# Patient Record
Sex: MALE | Race: ASIAN | NOT HISPANIC OR LATINO | Employment: FULL TIME | URBAN - METROPOLITAN AREA
[De-identification: names, ages, dates, MRNs, and addresses within clinical notes are randomized per-mention and may not be internally consistent; named-entity substitution may affect disease eponyms.]

---

## 2017-04-03 ENCOUNTER — HOSPITAL ENCOUNTER (OUTPATIENT)
Dept: GENERAL RADIOLOGY | Facility: HOSPITAL | Age: 37
Discharge: HOME | End: 2017-04-03
Attending: INTERNAL MEDICINE | Admitting: INTERNAL MEDICINE

## 2017-04-03 ENCOUNTER — TRANSCRIBE ORDERS (OUTPATIENT)
Dept: ADMINISTRATIVE | Facility: HOSPITAL | Age: 37
End: 2017-04-03

## 2017-04-03 DIAGNOSIS — M79.674 TOE PAIN, RIGHT: Primary | ICD-10-CM

## 2017-04-03 PROCEDURE — 73660 X-RAY EXAM OF TOE(S): CPT

## 2017-04-21 ENCOUNTER — TRANSCRIBE ORDERS (OUTPATIENT)
Dept: ADMINISTRATIVE | Facility: HOSPITAL | Age: 37
End: 2017-04-21

## 2017-04-21 DIAGNOSIS — M79.674 TOE PAIN, RIGHT: Primary | ICD-10-CM

## 2018-04-13 ENCOUNTER — TRANSCRIBE ORDERS (OUTPATIENT)
Dept: ADMINISTRATIVE | Facility: HOSPITAL | Age: 38
End: 2018-04-13

## 2018-04-16 ENCOUNTER — TRANSCRIBE ORDERS (OUTPATIENT)
Dept: ADMINISTRATIVE | Facility: HOSPITAL | Age: 38
End: 2018-04-16

## 2018-04-16 DIAGNOSIS — M79.672 BILATERAL FOOT PAIN: Primary | ICD-10-CM

## 2018-04-16 DIAGNOSIS — M79.671 BILATERAL FOOT PAIN: Primary | ICD-10-CM

## 2018-04-20 ENCOUNTER — HOSPITAL ENCOUNTER (OUTPATIENT)
Dept: NUCLEAR MEDICINE | Facility: HOSPITAL | Age: 38
Discharge: HOME | End: 2018-04-20
Attending: INTERNAL MEDICINE

## 2018-04-20 DIAGNOSIS — M79.671 BILATERAL FOOT PAIN: ICD-10-CM

## 2018-04-20 DIAGNOSIS — M79.672 BILATERAL FOOT PAIN: ICD-10-CM

## 2018-04-20 PROCEDURE — 0 TECHNETIUM MEDRONATE KIT: Performed by: INTERNAL MEDICINE

## 2018-04-20 PROCEDURE — A9503 TC99M MEDRONATE: HCPCS | Performed by: INTERNAL MEDICINE

## 2018-04-20 PROCEDURE — 78315 BONE IMAGING 3 PHASE: CPT

## 2018-04-20 RX ORDER — TC 99M MEDRONATE 20 MG/10ML
22 INJECTION, POWDER, LYOPHILIZED, FOR SOLUTION INTRAVENOUS
Status: COMPLETED | OUTPATIENT
Start: 2018-04-20 | End: 2018-04-20

## 2018-04-20 RX ADMIN — Medication 22 MILLICURIE: at 11:00

## 2018-05-02 ENCOUNTER — OFFICE VISIT (OUTPATIENT)
Dept: ORTHOPEDIC SURGERY | Facility: CLINIC | Age: 38
End: 2018-05-02

## 2018-05-02 VITALS
SYSTOLIC BLOOD PRESSURE: 118 MMHG | HEIGHT: 72 IN | WEIGHT: 169.53 LBS | BODY MASS INDEX: 22.96 KG/M2 | DIASTOLIC BLOOD PRESSURE: 95 MMHG | HEART RATE: 87 BPM

## 2018-05-02 DIAGNOSIS — R52 PAIN: Primary | ICD-10-CM

## 2018-05-02 DIAGNOSIS — M19.90 INFLAMMATORY ARTHRITIS: ICD-10-CM

## 2018-05-02 PROCEDURE — 99204 OFFICE O/P NEW MOD 45 MIN: CPT | Performed by: ORTHOPAEDIC SURGERY

## 2018-05-02 PROCEDURE — 20600 DRAIN/INJ JOINT/BURSA W/O US: CPT | Performed by: ORTHOPAEDIC SURGERY

## 2018-05-02 RX ORDER — METHYLPREDNISOLONE ACETATE 40 MG/ML
40 INJECTION, SUSPENSION INTRA-ARTICULAR; INTRALESIONAL; INTRAMUSCULAR; SOFT TISSUE
Status: COMPLETED | OUTPATIENT
Start: 2018-05-02 | End: 2018-05-02

## 2018-05-02 RX ORDER — BUPIVACAINE HYDROCHLORIDE 2.5 MG/ML
0.25 INJECTION, SOLUTION INFILTRATION; PERINEURAL
Status: COMPLETED | OUTPATIENT
Start: 2018-05-02 | End: 2018-05-02

## 2018-05-02 RX ORDER — ROSUVASTATIN CALCIUM 20 MG/1
20 TABLET, COATED ORAL DAILY
COMMUNITY
End: 2019-03-01 | Stop reason: SDUPTHER

## 2018-05-02 RX ORDER — BUPIVACAINE HYDROCHLORIDE 2.5 MG/ML
0.5 INJECTION, SOLUTION INFILTRATION; PERINEURAL
Status: COMPLETED | OUTPATIENT
Start: 2018-05-02 | End: 2018-05-02

## 2018-05-02 RX ADMIN — METHYLPREDNISOLONE ACETATE 40 MG: 40 INJECTION, SUSPENSION INTRA-ARTICULAR; INTRALESIONAL; INTRAMUSCULAR; SOFT TISSUE at 13:55

## 2018-05-02 RX ADMIN — BUPIVACAINE HYDROCHLORIDE 0.25 ML: 2.5 INJECTION, SOLUTION INFILTRATION; PERINEURAL at 13:56

## 2018-05-02 RX ADMIN — BUPIVACAINE HYDROCHLORIDE 0.5 ML: 2.5 INJECTION, SOLUTION INFILTRATION; PERINEURAL at 13:55

## 2018-05-02 RX ADMIN — METHYLPREDNISOLONE ACETATE 40 MG: 40 INJECTION, SUSPENSION INTRA-ARTICULAR; INTRALESIONAL; INTRAMUSCULAR; SOFT TISSUE at 13:56

## 2018-05-02 NOTE — PROGRESS NOTES
Procedure   Small Joint Arthrocentesis  Consent given by: patient  Site marked: site marked  Timeout: Immediately prior to procedure a time out was called to verify the correct patient, procedure, equipment, support staff and site/side marked as required   Supporting Documentation  Indications: pain   Procedure Details  Location: third toe - L third MTP  Needle size: 22 G  Approach: dorsal  Medications administered: 0.5 mL bupivacaine 0.25 %; 40 mg methylPREDNISolone acetate 40 MG/ML  Patient tolerance: patient tolerated the procedure well with no immediate complications

## 2018-05-02 NOTE — PROGRESS NOTES
Procedure   Small Joint Arthrocentesis  Consent given by: patient  Site marked: site marked  Timeout: Immediately prior to procedure a time out was called to verify the correct patient, procedure, equipment, support staff and site/side marked as required   Supporting Documentation  Indications: pain   Procedure Details  Location: second toe - R second MTP  Needle size: 22 G  Approach: dorsal  Medications administered: 40 mg methylPREDNISolone acetate 40 MG/ML; 0.25 mL bupivacaine 0.25 %  Patient tolerance: patient tolerated the procedure well with no immediate complications

## 2018-05-02 NOTE — PROGRESS NOTES
NEW PATIENT    Patient: Reymundo Celeste  : 1980    Primary Care Provider: Dariel Marie MD    Requesting Provider: As above    Pain of the Right Foot and Pain of the Left Foot      History    Chief Complaint: Bilateral toe pain    History of Present Illness: This is a very pleasant 37-year-old gentleman with an interesting history.  He reports that about a year ago he developed pain in the right second toe.  The toe was swollen.  The metatarsal phalangeal joint was swollen.  It was more painful barefoot, felt better with padding.  He saw a podiatrist, who recommended athletic shoes and an elastic sleeve with a gel pad.  He reports that the pain is much better with the pad in the shoe, but it still severe barefoot, and still hurts at the end of a long day.  In January of this year he began to have similar pain and swelling in the left third toe.  He notes that the toe often looks like a sausage.  It is worse barefoot, better with padding.  He notes that even if his son slightly touches the third toe, it's very painful.  2 years ago he had a history of a left knee effusion, with essentially negative MRI.  Dr. Lopez aspirated it, and thought that he might have an inflammatory arthritis.  He saw Dr. Bandar Glass, but no definitive diagnosis of inflammatory arthritis was made.  He has another appointment with Dr. Glass in the near future, due to the presence of the toe pain and swelling.  He had a bone scan done that I looked at, it was read as a possible stress fracture in the right second proximal phalanx and left third proximal phalanx.  This was done on 2018.  To my evaluation, I  do not think it is a stress fracture.  I think it is probably a response to inflammation in the joint, and periostitis.  I explained that stress fractures in phalanges are extraordinarily rare.  However when the joint is inflamed and a toe has periostitis, the phalanx has increased uptake on a bone scan.  He  "reports he has had some neck stiffness and soreness over the past several days, due to  long distance traveling.  No other joint problems.  He is a  for a [employer].  He has played cricket quite avidly in the past.    No current outpatient prescriptions on file prior to visit.     No current facility-administered medications on file prior to visit.       No Known Allergies   History reviewed. No pertinent past medical history.  No past surgical history on file.  Family History   Problem Relation Age of Onset   • Cancer Mother    • Diabetes Mother    • Hypertension Mother    • Hypertension Father       Social History     Social History   • Marital status:      Spouse name: N/A   • Number of children: N/A   • Years of education: N/A     Occupational History   • Not on file.     Social History Main Topics   • Smoking status: Never Smoker   • Smokeless tobacco: Never Used   • Alcohol use No   • Drug use: No   • Sexual activity: Defer     Other Topics Concern   • Not on file     Social History Narrative   • No narrative on file        Review of Systems   Constitutional: Negative.    HENT: Negative.    Eyes: Negative.    Respiratory: Negative.    Cardiovascular: Negative.    Gastrointestinal: Negative.    Endocrine: Negative.    Genitourinary: Negative.    Musculoskeletal: Positive for arthralgias, joint swelling, neck pain and neck stiffness.   Skin: Negative.    Allergic/Immunologic: Negative.    Neurological: Negative.    Hematological: Negative.    Psychiatric/Behavioral: Negative.        The following portions of the patient's history were reviewed and updated as appropriate: allergies, current medications, past family history, past medical history, past social history, past surgical history and problem list.    Physical Exam:   /95   Pulse 87   Ht 182.9 cm (72\")   Wt 76.9 kg (169 lb 8.5 oz)   BMI 22.99 kg/m²   GENERAL: Body habitus: normal weight for height    Lower extremity " edema: Left: none; Right: none    Varicose veins:  Left: none; Right: none    Gait: normal     Mental Status:  awake and alert; oriented to person, place, and time    Voice:  clear  SKIN:  Normal and warm and dry    Hair Growth:  Right:normal; Left:  normal  NAILS: Toenails: normal  HEENT: Head: Normocephalic, atraumatic,  without obvious abnormality.  eye: normal external eye, no icterus  ears: normal external ears  nose: normal external nose  pharynx: dental hygiene adequate  PULM:  Repiratory effort normal  CV:  Dorsalis Pedis:  Right: 2+; Left:2+    Posterior Tibial: Right:2+; Left:2+    Capillary Refill:  Brisk  MSK:  Hand:right handed, sensation intact and No synovitis      Tibia:  Right:  non tender; Left:  non tender      Ankle:  Right: non tender, ROM  normal and symmetric and motor function  normal; Left:  non tender, ROM  normal and symmetric and motor function  normal      Foot:  Right:  Second toe is mildly thickened throughout its entirety, no significant hammertoe deformity, very tender to palpation throughout the toe, and in the second metatarsal phalangeal joint, otherwise nontender; Left:  Very tender throughout the third toe, tender in the third metatarsal phalangeal joint, third toe is moderately thickened compared to the right foot.  Otherwise nontender, no significant hammertoe deformity      NEURO: Heel Walking:  Right:  normal; Left:  normal    Toe Walking:  Right:  limited ability, painful; Left:  limited ability, painful     [place] 5.07 monofilament test: normal    Lower extremity sensation: intact     Reflexes:  Biceps:  Right:  1+; Left:  1+           Quads:  Right:  1+; Left:  1+           Ankle:  Right:  1+; Left:  1+      Calf Atrophy:none    Motor Function: all 5/5         Medical Decision Making    Data Review:   ordered and reviewed x-rays today, reviewed prior lab results, reviewed radiology images, reviewed radiology results and reviewed outside records    Assessment and  "Plan/ Diagnosis/Treatment options:   1. Inflammatory arthritis  I did not find any evidence of psoriasis, but given his history of knee inflammation and swelling, and the current sausage digits, I suspect he has psoriatic arthritis.  It also might be one of the other seronegative inflammatory arthritis.  I explained to him why I do not think the bone scan represents a stress fracture, again a stress fracture in her phalanx is extraordinarily rare.  I explained that synovitis of metatarsal phalangeal joints is fairly common, it can be the start of developing a hammertoe.  However it's not common in 37-year-old man.  And it's not common for the toe itself to be swollen.  I do think he has sausage digits, which indicates periostitis, as well as inflammation in the metatarsal phalangeal joints.  I think it's a very good idea that he has an appointment with Dr. Glass in the near future.  I looked at the notes from Dr. piedad diop in the past, and the results of lab studies.  Again I do think he has some type of inflammatory process.  I do not think what he has is the \"garden-variety\" early hammertoe problem.  I would still treat him symptomatically however, I recommend custom orthotics and Budin splints.  I showed him how to use the splints.  We also discussed cortisone injection.  Because he had good results from his knee injection, he would like to try that.  I explained that it does increase the risk of dislocation of the toes, and that it is important that he keep the Budin splints on whenever he is walking.  I explained I do not do more than 1 injection in the small metatarsal phalangeal joints.  Unlike large joints they don't tolerate repeated injection, because of the risk of ligamentous loosening.  I will see him again in 4-5 months standing x-ray of both feet.    After discussing the risks (including infection, skin atrophy, etc), time out was called,  the right 2nd MTPJ was prepped and using sterile technique " injected with 0.5cc of 0.5% marcaine and 1cc (40mg) DepoMedrol.  A band aid was applied.  No complications.     After discussing the risks (including infection, skin atrophy, etc), time out was called,  the left 3rd MTPJ was prepped and using sterile technique injected with 0.5cc of 0.5% marcaine and 1cc (40mg) DepoMedrol.  A band aid was applied.  No complications.

## 2019-02-21 PROBLEM — Z87.828 HISTORY OF MENISCAL TEAR: Status: ACTIVE | Noted: 2019-02-21

## 2019-02-21 PROBLEM — E55.9 VITAMIN D DEFICIENCY: Status: ACTIVE | Noted: 2019-02-21

## 2019-02-21 PROBLEM — Z87.442 HISTORY OF KIDNEY STONES: Status: ACTIVE | Noted: 2019-02-21

## 2019-02-21 PROBLEM — E78.2 MIXED HYPERLIPIDEMIA: Status: ACTIVE | Noted: 2019-02-21

## 2019-02-26 ENCOUNTER — OFFICE VISIT (OUTPATIENT)
Dept: INTERNAL MEDICINE | Facility: CLINIC | Age: 39
End: 2019-02-26

## 2019-02-26 VITALS
SYSTOLIC BLOOD PRESSURE: 112 MMHG | HEART RATE: 72 BPM | HEIGHT: 72 IN | DIASTOLIC BLOOD PRESSURE: 78 MMHG | BODY MASS INDEX: 23.43 KG/M2 | WEIGHT: 173 LBS

## 2019-02-26 DIAGNOSIS — M19.90 INFLAMMATORY ARTHRITIS: ICD-10-CM

## 2019-02-26 DIAGNOSIS — Z87.442 HISTORY OF KIDNEY STONES: ICD-10-CM

## 2019-02-26 DIAGNOSIS — Z87.828 HISTORY OF MENISCAL TEAR: ICD-10-CM

## 2019-02-26 DIAGNOSIS — E78.2 MIXED HYPERLIPIDEMIA: Primary | ICD-10-CM

## 2019-02-26 PROCEDURE — 99395 PREV VISIT EST AGE 18-39: CPT | Performed by: INTERNAL MEDICINE

## 2019-02-26 NOTE — PROGRESS NOTES
[facility]     Reymundo Celeste  1980   [id number]      Patient Care Team:  Dariel Marie MD as PCP - General  Dariel Marie MD as PCP - Family Medicine  Bennett Glass MD as Consulting Physician (Rheumatology)    Chief Complaint::   Chief Complaint   Patient presents with   • Annual Exam   History of mixed hyperlipidemia currently not on therapy  History of meniscus tear left knee  History of third toe foot discomfort  History of possible inflammatory arthritis-psoriatic arthritis        HPI  Patient is a 38-year-old male planes of discomfort left foot third toe metatarsal base left knee discomfort with previous injection by Dr. piedad diop with probable meniscus tear history of psoriatic arthritis without complete diagnostic.  Being managed by Dr. Rivera no longer on Celebrex history of dyslipidemia of Crestor for the past 6 months.  Patient overall feels well changes in diet medication or activity or rest    Chronic Conditions: Chronic problems of mixed hyperlipidemia currently off Crestor left knee discomfort     Patient Active Problem List   Diagnosis   • Inflammatory arthritis   • History of meniscal tear   • History of kidney stones   • Mixed hyperlipidemia   • Vitamin D deficiency        Past Medical History:   Diagnosis Date   • History of tear of meniscus of knee joint     injection   • Hyperlipidemia    • Inflammatory arthritis    • Kidney stone    • Vitamin D deficiency        No past surgical history on file.    Family History   Problem Relation Age of Onset   • Cancer Mother         pancreas   • Diabetes Mother    • Hypertension Mother    • Hypertension Father        Social History     Socioeconomic History   • Marital status:      Spouse name: Not on file   • Number of children: Not on file   • Years of education: Not on file   • Highest education level: Not on file   Social Needs   • Financial resource strain: Not on file   • Food insecurity - worry: Not on  "file   • Food insecurity - inability: Not on file   • Transportation needs - medical: Not on file   • Transportation needs - non-medical: Not on file   Occupational History   • Not on file   Tobacco Use   • Smoking status: Never Smoker   • Smokeless tobacco: Never Used   Substance and Sexual Activity   • Alcohol use: No   • Drug use: No   • Sexual activity: Defer   Other Topics Concern   • Not on file   Social History Narrative   • Not on file       No Known Allergies      There is no immunization history on file for this patient.     Health Maintenance Due   Topic Date Due   • TDAP/TD VACCINES (1 - Tdap) 08/24/1998   • LIPID PANEL  02/26/2019        Review of Systems     HEENT: Denies headache dizziness syncope hearing loss nose or throat trouble  NECK: Denies dysphagia or stiffness  CHEST: Denies cough or wheeze  CARDIAC: Denies chest pain or palpitations  ABD: Denies nausea vomiting  : Denies dysuria frequency  NEURO: Denies syncope or neuropathy  PSYCH: Denies anxiety depression  EXTREM: Complains of some mild soreness left knee with remote possible meniscus tear treated by Dr. piedad diop in the past and base of left second rather third toe discomfort and possible history of psoriatic arthritis laboratory confirmed.    Vital Signs  Vitals:    02/26/19 1556   BP: 112/78   Pulse: 72   Weight: 78.5 kg (173 lb)   Height: 182.9 cm (72\")   PainSc: 0-No pain         Current Outpatient Medications:   •  rosuvastatin (CRESTOR) 20 MG tablet, Take 20 mg by mouth Daily., Disp: , Rfl:     Physical Exam:  HEENT: Pupils equal reactive no asymmetry  NECK: No mass or bruit  CHEST: Clear  CARDIAC: Regular rhythm no murmur  ABD: Liver and spleen normal positive bowel sounds  :   NEURO: Neuro intact no symmetry no neuropathy  PSYCH: No anxiety depression  EXTREM: Left knee good range of motion fluid currently stable left foot base third toe some metatarsal soreness  Skin: Clear     Results Review:    Should to return for " fasting lab of CMP and lipid  Procedures    Medication Review: Medications reviewed and noted    Patient wellness counseling  Exercise: She is physically active encouraged to continue same  Diet: Cardiac healthy diet  Smoking: Non-smoker  Alcohol: Rare ethanol  Screening: Active screening    Assessment/Plan: #1 mixed hyperlipidemia currently not on therapy patient should return for fasting labs probably need to resume his Crestor as of previous elevations of cholesterol and LDL  #2 inflammatory arthritis possible psoriatic confirmed by rheumatologist confirming lab work not on therapy-does not need therapy at this time  #3 left knee history of meniscal tear stable has seen Dr. ECKERT in the past  #4 history of kidney stones no recurrence in asymptomatic    Plan  Return for fasting CMP and lipid  Will probably start statin therapy again    Patient Instructions   Return for fasting CMP and lipid  Probably resume statin therapy  See the patient back in the office in 6 months       CMP:  Lab Results   Component Value Date    BUN 13 02/27/2019    CREATININE 0.85 02/27/2019    EGFRIFNONA 100 02/27/2019    EGFRIFAFRI 122 02/27/2019    BCR 15.3 02/27/2019     02/27/2019    K 4.3 02/27/2019    CO2 27.0 02/27/2019    CALCIUM 10.1 02/27/2019    ALBUMIN 4.90 (H) 02/27/2019    BILITOT 0.8 02/27/2019    ALKPHOS 84 02/27/2019    AST 23 02/27/2019    ALT 26 02/27/2019     HbA1c:  No results found for: HGBA1C  Microalbumin:  No results found for: MICROALBUR, POCMALB, POCCREAT  Lipid Panel  Lab Results   Component Value Date    CHOL 236 (H) 02/27/2019    TRIG 92 02/27/2019    HDL 24 (L) 02/27/2019     (H) 02/27/2019    AST 23 02/27/2019    ALT 26 02/27/2019        Counseling was given to patient for the following topics: appropriate exercise     Plan of care reviewed with patient at the conclusion of today's visit. Education was provided regarding diagnosis, management, and any prescribed or recommended OTC  medications.Patient verbalizes understanding of and agreement with management plan.         Max Tadeo MD

## 2019-02-27 ENCOUNTER — CLINICAL SUPPORT (OUTPATIENT)
Dept: INTERNAL MEDICINE | Facility: CLINIC | Age: 39
End: 2019-02-27

## 2019-02-27 DIAGNOSIS — E78.2 MIXED HYPERLIPIDEMIA: ICD-10-CM

## 2019-02-27 LAB
ALBUMIN SERPL-MCNC: 4.9 G/DL (ref 3.2–4.8)
ALBUMIN/GLOB SERPL: 1.9 G/DL (ref 1.5–2.5)
ALP SERPL-CCNC: 84 U/L (ref 25–100)
ALT SERPL W P-5'-P-CCNC: 26 U/L (ref 7–40)
ANION GAP SERPL CALCULATED.3IONS-SCNC: 7 MMOL/L (ref 3–11)
ARTICHOKE IGE QN: 205 MG/DL (ref 0–130)
AST SERPL-CCNC: 23 U/L (ref 0–33)
BILIRUB SERPL-MCNC: 0.8 MG/DL (ref 0.3–1.2)
BUN BLD-MCNC: 13 MG/DL (ref 9–23)
BUN/CREAT SERPL: 15.3 (ref 7–25)
CALCIUM SPEC-SCNC: 10.1 MG/DL (ref 8.7–10.4)
CHLORIDE SERPL-SCNC: 105 MMOL/L (ref 99–109)
CHOLEST SERPL-MCNC: 236 MG/DL (ref 0–200)
CO2 SERPL-SCNC: 27 MMOL/L (ref 20–31)
CREAT BLD-MCNC: 0.85 MG/DL (ref 0.6–1.3)
GFR SERPL CREATININE-BSD FRML MDRD: 100 ML/MIN/1.73
GFR SERPL CREATININE-BSD FRML MDRD: 122 ML/MIN/1.73
GLOBULIN UR ELPH-MCNC: 2.6 GM/DL
GLUCOSE BLD-MCNC: 86 MG/DL (ref 70–100)
HDLC SERPL-MCNC: 24 MG/DL (ref 40–60)
POTASSIUM BLD-SCNC: 4.3 MMOL/L (ref 3.5–5.5)
PROT SERPL-MCNC: 7.5 G/DL (ref 5.7–8.2)
SODIUM BLD-SCNC: 139 MMOL/L (ref 132–146)
TRIGL SERPL-MCNC: 92 MG/DL (ref 0–150)

## 2019-02-27 PROCEDURE — 80053 COMPREHEN METABOLIC PANEL: CPT | Performed by: INTERNAL MEDICINE

## 2019-02-27 PROCEDURE — 36415 COLL VENOUS BLD VENIPUNCTURE: CPT | Performed by: INTERNAL MEDICINE

## 2019-02-27 PROCEDURE — 80061 LIPID PANEL: CPT | Performed by: INTERNAL MEDICINE

## 2019-02-28 NOTE — PATIENT INSTRUCTIONS
Return for fasting CMP and lipid  Probably resume statin therapy  See the patient back in the office in 6 months

## 2019-03-01 DIAGNOSIS — E78.2 MIXED HYPERLIPIDEMIA: Primary | ICD-10-CM

## 2019-03-01 RX ORDER — ROSUVASTATIN CALCIUM 20 MG/1
20 TABLET, COATED ORAL DAILY
Qty: 30 TABLET | Refills: 5 | Status: SHIPPED | OUTPATIENT
Start: 2019-03-01 | End: 2021-07-14 | Stop reason: SDUPTHER

## 2019-08-27 ENCOUNTER — OFFICE VISIT (OUTPATIENT)
Dept: INTERNAL MEDICINE | Facility: CLINIC | Age: 39
End: 2019-08-27

## 2019-08-27 VITALS
HEART RATE: 76 BPM | BODY MASS INDEX: 23.03 KG/M2 | DIASTOLIC BLOOD PRESSURE: 64 MMHG | SYSTOLIC BLOOD PRESSURE: 100 MMHG | WEIGHT: 170 LBS | HEIGHT: 72 IN

## 2019-08-27 DIAGNOSIS — E78.2 MIXED HYPERLIPIDEMIA: Primary | ICD-10-CM

## 2019-08-27 DIAGNOSIS — M19.90 INFLAMMATORY ARTHRITIS: ICD-10-CM

## 2019-08-27 PROCEDURE — 99213 OFFICE O/P EST LOW 20 MIN: CPT | Performed by: INTERNAL MEDICINE

## 2019-08-27 NOTE — PROGRESS NOTES
[facility]     Reymundo Celeste  1980   [id number]      Patient Care Team:  Max Tadeo MD as PCP - General (Internal Medicine)  Dariel Marie MD as PCP - Family Medicine  Bennett Glass MD as Consulting Physician (Rheumatology)    Chief Complaint::   Chief Complaint   Patient presents with   • Hyperlipidemia            HPI  Patient 39-year-old male with a history of inflammatory arthritic changes followed by rheumatology on PRN Celebrex 100 twice daily with a history of mixed hyperlipidemia with elevated cholesterol and LDL and decreased HDL we placed the patient on Crestor 20 mg daily he denies myalgia await fasting lipid and CMP this day.      Patient Active Problem List   Diagnosis   • Inflammatory arthritis   • History of meniscal tear   • History of kidney stones   • Mixed hyperlipidemia   • Vitamin D deficiency        Past Medical History:   Diagnosis Date   • History of tear of meniscus of knee joint     injection   • Hyperlipidemia    • Inflammatory arthritis    • Kidney stone    • Vitamin D deficiency        History reviewed. No pertinent surgical history.    Family History   Problem Relation Age of Onset   • Cancer Mother         pancreas   • Diabetes Mother    • Hypertension Mother    • Hypertension Father        Social History     Socioeconomic History   • Marital status:      Spouse name: Not on file   • Number of children: Not on file   • Years of education: Not on file   • Highest education level: Not on file   Tobacco Use   • Smoking status: Never Smoker   • Smokeless tobacco: Never Used   Substance and Sexual Activity   • Alcohol use: No   • Drug use: No   • Sexual activity: Defer       No Known Allergies    Review of Systems     HEENT: Denies headache dizzy eye or nose mouth or throat difficulty  NECK: Denies pain stiffness swelling dysphasia reflux  CHEST: Denies cough or wheeze is a non-smoker  CARDIAC: Denies chest pain pressure tightness or  "palpitations  ABD: Denies nausea vomiting abdominal pain  : Denies dysuria frequency  NEURO: Denies syncope concussion or neuropathy  PSYCH: Denies anxiety depression  EXTREM: Complains of knee discomfort with meniscal tear left remote and complains of toe discomfort soreness and swelling is followed by rheumatology currently off therapy of Celebrex taken as needed.    Vital Signs  Vitals:    08/27/19 1426   BP: 100/64   BP Location: Left arm   Patient Position: Sitting   Cuff Size: Adult   Pulse: 76   Weight: 77.1 kg (170 lb)   Height: 182.9 cm (72\")   PainSc: 0-No pain     Body mass index is 23.06 kg/m².    Current Outpatient Medications:   •  rosuvastatin (CRESTOR) 20 MG tablet, Take 1 tablet by mouth Daily., Disp: 30 tablet, Rfl: 5    Physical Exam     ACE III MINI         HEENT: Pupils equal reactive ENT clear no asymmetry  NECK: No masses bruits thyromegaly or neck vein distention  CHEST: Clear  CARDIAC: Regular rhythm without gallop or rub  ABD: Liver and spleen are normal  :   NEURO: CNS is intact no neuropathy  PSYCH: Normal  EXTREM: Minimal change noted of toes remote left knee meniscal injury  Skin: Clear     Results Review:    No results found for this or any previous visit (from the past 672 hour(s)).  Procedures    Medication Review: Medications reviewed and noted    Patient wellness counseling  Exercise: Continue physical activity of walking  Diet: Continue healthy cardiac diet  Smoking: Non-smoker  Alcohol:  Screening: CMP and lipids pending    Assessment/Plan:    Problem List Items Addressed This Visit        Cardiovascular and Mediastinum    Mixed hyperlipidemia - Primary    Current Assessment & Plan     Patient had fasting lab of cholesterol 236 on March 1 LDL of 205 and H DL of 24 was placed on Crestor 20 mg daily denies myalgia he continues to take the medication suggest follow-up of his CMP and lipid         Relevant Medications    rosuvastatin (CRESTOR) 20 MG tablet    Other Relevant " Orders    Comprehensive Metabolic Panel    Lipid Panel       Musculoskeletal and Integument    Inflammatory arthritis    Overview     History of inflammatory arthritis elevated by Dr. Glass had [facility] currently complaining of metatarsal head discomfort left foot middle toe right foot second toe Celebrex 100 twice daily has been effective he currently is not on that medicine he is followed by rheumatology.         Relevant Medications    bupivacaine (MARCAINE) 0.25 % injection 0.5 mL (Completed)    methylPREDNISolone acetate (DEPO-medrol) injection 40 mg (Completed)    bupivacaine (MARCAINE) 0.25 % injection 0.25 mL (Completed)    methylPREDNISolone acetate (DEPO-medrol) injection 40 mg (Completed)           Patient Instructions   Continue Crestor  Fasting lab pending  Encouraged walking exercise  Encouraged healthy heart diet  Return visit in 6 months       Plan of care reviewed with patient at the conclusion of today's visit. Education was provided regarding diagnosis, management, and any prescribed or recommended OTC medications.Patient verbalizes understanding of and agreement with management plan.         Max Tadeo MD      Note: Part of this note may be an electronic transcription/translation of spoken language to printed text using the Dragon Dictation system.

## 2019-08-27 NOTE — ASSESSMENT & PLAN NOTE
Patient had fasting lab of cholesterol 236 on March 1 LDL of 205 and H DL of 24 was placed on Crestor 20 mg daily denies myalgia he continues to take the medication suggest follow-up of his CMP and lipid

## 2019-08-28 NOTE — PATIENT INSTRUCTIONS
Continue Crestor  Fasting lab pending  Encouraged walking exercise  Encouraged healthy heart diet  Return visit in 6 months

## 2020-02-03 ENCOUNTER — TELEPHONE (OUTPATIENT)
Dept: INTERNAL MEDICINE | Facility: CLINIC | Age: 40
End: 2020-02-03

## 2020-02-03 RX ORDER — OSELTAMIVIR PHOSPHATE 75 MG/1
75 CAPSULE ORAL DAILY
Qty: 10 CAPSULE | Refills: 0 | Status: SHIPPED | OUTPATIENT
Start: 2020-02-03 | End: 2020-02-13

## 2020-02-03 NOTE — TELEPHONE ENCOUNTER
Wife of patient called for preventative medication for herself and her  since her son (10 years old) was given a positive flu DX this am at the PEDS office.    MATEO LENNON confirmed

## 2020-02-13 ENCOUNTER — TELEPHONE (OUTPATIENT)
Dept: INTERNAL MEDICINE | Facility: CLINIC | Age: 40
End: 2020-02-13

## 2020-02-13 NOTE — TELEPHONE ENCOUNTER
Labs ordered 08/27/19. Never completed. Has not been seen since 08/27/19. Future appointment 03/09/20. Can we cancel labs?

## 2020-03-16 ENCOUNTER — OFFICE VISIT (OUTPATIENT)
Dept: INTERNAL MEDICINE | Facility: CLINIC | Age: 40
End: 2020-03-16

## 2020-03-16 VITALS
DIASTOLIC BLOOD PRESSURE: 70 MMHG | HEART RATE: 64 BPM | BODY MASS INDEX: 23.84 KG/M2 | HEIGHT: 72 IN | WEIGHT: 176 LBS | SYSTOLIC BLOOD PRESSURE: 108 MMHG

## 2020-03-16 DIAGNOSIS — D22.9 NUMEROUS SKIN MOLES: Primary | ICD-10-CM

## 2020-03-16 DIAGNOSIS — M19.90 INFLAMMATORY ARTHRITIS: ICD-10-CM

## 2020-03-16 DIAGNOSIS — E78.2 MIXED HYPERLIPIDEMIA: ICD-10-CM

## 2020-03-16 PROCEDURE — 99396 PREV VISIT EST AGE 40-64: CPT | Performed by: INTERNAL MEDICINE

## 2020-03-16 NOTE — ASSESSMENT & PLAN NOTE
2 pedunculated skin moles left axillary and right posterior leg they are irritating to the patient will refer to dermatology for removal consultation.

## 2020-03-16 NOTE — ASSESSMENT & PLAN NOTE
History of mixed hyperlipidemia on Crestor 20 mg fasting CMP and lipid are pending no change therapy

## 2020-03-16 NOTE — PROGRESS NOTES
[facility]     Reymundo Celeste  1980   [id number]      Patient Care Team:  Max Tadeo MD as PCP - General (Internal Medicine)  Dariel Marie MD as PCP - Family Medicine  Bennett Glass MD as Consulting Physician (Rheumatology)    Chief Complaint::   Chief Complaint   Patient presents with   • Annual Exam     patient had toast for breakfast   • myalgia     generalized. Mostly in the morning and resolves.  Wonders about the cholesterol medicine.             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 he has been given Celebrex but has not taken this as he does not believe his discomfort is sufficient to warrant that therapy.  Overall patient feels well denies headache or dizzy has a history of mixed hyperlipidemia on Crestor 20 mill grams daily does complain of some right greater than left shoulder posterior discomfort that occurs only in the morning resolves during the late morning and day denies injury.  He is seen by rheumatology approximately every 6 months has been stable for the past 2 years.  Patient is a non-smoker nonalcohol drinker does not have a living well he does complain of mild left axillary and right posterior leg that seem to be irritating would like a dermatology referral.      Patient Active Problem List   Diagnosis   • Inflammatory arthritis   • History of meniscal tear   • History of kidney stones   • Mixed hyperlipidemia   • Vitamin D deficiency   • Numerous skin moles        Past Medical History:   Diagnosis Date   • History of tear of meniscus of knee joint     injection   • Hyperlipidemia    • Inflammatory arthritis    • Kidney stone    • Vitamin D deficiency        History reviewed. No pertinent surgical history.    Family History   Problem Relation Age of Onset   • Cancer Mother       "   pancreas   • Diabetes Mother    • Hypertension Mother    • Hypertension Father        Social History     Socioeconomic History   • Marital status:      Spouse name: Not on file   • Number of children: Not on file   • Years of education: Not on file   • Highest education level: Not on file   Tobacco Use   • Smoking status: Never Smoker   • Smokeless tobacco: Never Used   Substance and Sexual Activity   • Alcohol use: No   • Drug use: No   • Sexual activity: Defer       No Known Allergies      There is no immunization history on file for this patient.     Health Maintenance Due   Topic Date Due   • TDAP/TD VACCINES (1 - Tdap) 08/24/1990   • INFLUENZA VACCINE  08/01/2019   • LIPID PANEL  02/27/2020        Review of Systems     HEENT: Denies headache or vision changes or hearing changes denies chronic allergy  NECK: Denies pain stiffness swelling dysphasia or stiffness  CHEST: Non-smoker denies cough or wheeze  CARDIAC: Denies chest pain pressure palpitations no history of hypertension  ABD: Denies nausea vomiting abdominal pain  : Denies dysuria frequency  NEURO: Denies syncope concussion  PSYCH: Denies anxiety depression  EXTREM: Complains of posterior shoulder discomfort in the a.m. not throughout the day complains of left toe left foot discomfort from his inflammatory arthritic psoriatic arthritis arthritic changes    Vital Signs  Vitals:    03/16/20 1354   BP: 108/70   BP Location: Left arm   Patient Position: Sitting   Cuff Size: Adult   Pulse: 64   Weight: 79.8 kg (176 lb)   Height: 182.9 cm (72\")   PainSc: 0-No pain     Body mass index is 23.87 kg/m².  Patient's Body mass index is 23.87 kg/m². BMI is within normal parameters. No follow-up required..     Advance Care Planning   ACP discussion was held with the patient during this visit. Patient does not have an advance directive, declines further assistance.       Current Outpatient Medications:   •  rosuvastatin (CRESTOR) 20 MG tablet, Take 1 tablet " by mouth Daily., Disp: 30 tablet, Rfl: 5    Physical Exam   HEENT: No facial asymmetry pharynx is clear  NECK: No masses thyromegaly bruit or neck vein distention  CHEST: Clear without rales or wheezing  CARDIAC: Regular rhythm without gallop or rub  ABD: Liver spleen are normal good bowel sounds nontender  : Deferred  NEURO: Intact  PSYCH: Normal  EXTREM: Mild soreness left foot  Skin: Skin is clear without psoriasis no nail pitting mole left axillary and right posterior leg     Results Review:    Will return for fasting lab EKG not done  Procedures Patient Wellness Counseling:   Plan of care reviewed with patient at the conclusion of today's visit. Education was provided in regards to diagnosis, diet and exercise, prostate cancer screening discussed including benefit of early detection, potential need for follow-up, and harms associated with additional management. Patient agrees to screening.    Nutrition, physical activity, healthy weight,ways to reduce stress, adequate sleep, injury prevention, misuse of tobacco, alcohol and drugs, sexual behavior and STD's, dental health, mental health, and immunizations.    Plan of care reviewed with patient at the conclusion of today's visit. Education was provided regarding diagnosis, management and any prescribed or recommended OTC medications.  Patient verbalizes understanding of and agreement with management plan.        Medication Review: Medications reviewed and noted    Patient wellness counseling  Exercise: Encouraged continued exercise  Diet: Encouraged continued healthy cardiac diet  Smoking: Non-smoker  Alcohol: None alcohol  Screening: Return for fasting lab    Assessment/Plan:  Problem List Items Addressed This Visit        Cardiovascular and Mediastinum    Mixed hyperlipidemia    Overview     History of mixed hyperlipidemia on Crestor 20 mg daily denies myalgia arthralgia         Current Assessment & Plan     History of mixed hyperlipidemia on Crestor 20 mg  fasting CMP and lipid are pending no change therapy         Relevant Medications    rosuvastatin (CRESTOR) 20 MG tablet    Other Relevant Orders    Lipid Panel       Musculoskeletal and Integument    Inflammatory arthritis    Overview     History of inflammatory arthritis elevated by Dr. [facility] currently complaining of metatarsal head discomfort left foot middle toe right foot second toe Celebrex 100 twice daily has been effective he currently is not on that medicine he is followed by rheumatology.         Current Assessment & Plan     Patient has a history of psoriatic arthritis inflammatory arthritis primarily left foot denies skin lesions is seen by Dr. Glass rheumat[facility] currently on no therapy other than Celebrex of which the patient has not been taking is currently stable with only minor discomfort left foot patient has had some discomfort in his right and left posterior shoulder that he is wonders was related to the Crestor this occurs only in the morning when he arises from bed denies any injury do not believe this is related to the Crestor.         Relevant Medications    bupivacaine (MARCAINE) 0.25 % injection 0.5 mL (Completed)    methylPREDNISolone acetate (DEPO-medrol) injection 40 mg (Completed)    bupivacaine (MARCAINE) 0.25 % injection 0.25 mL (Completed)    methylPREDNISolone acetate (DEPO-medrol) injection 40 mg (Completed)    Other Relevant Orders    CBC & Differential    Comprehensive Metabolic Panel    Lipid Panel    TSH    Numerous skin moles - Primary    Overview     Patient has numerous skin moles 1 left axillary 1 right posterior leg that are pedunculated and irritating         Current Assessment & Plan     2 pedunculated skin moles left axillary and right posterior leg they are irritating to the patient will refer to dermatology for removal consultation.         Relevant Orders    Ambulatory Referral to Dermatology (Completed)           Patient Instructions   Return  for fasting lab  Current medications  [facility] to [facility] for mole left axillary right posterior leg  Return visit in 1 year PRN       CMP:  Lab Results   Component Value Date    BUN 13 02/27/2019    CREATININE 0.85 02/27/2019    EGFRIFNONA 100 02/27/2019    EGFRIFAFRI 122 02/27/2019    BCR 15.3 02/27/2019     02/27/2019    K 4.3 02/27/2019    CO2 27.0 02/27/2019    CALCIUM 10.1 02/27/2019    ALBUMIN 4.90 (H) 02/27/2019    BILITOT 0.8 02/27/2019    ALKPHOS 84 02/27/2019    AST 23 02/27/2019    ALT 26 02/27/2019     HbA1c:  No results found for: HGBA1C  Microalbumin:  No results found for: MICROALBUR, POCMALB, POCCREAT  Lipid Panel  Lab Results   Component Value Date    CHOL 236 (H) 02/27/2019    TRIG 92 02/27/2019    HDL 24 (L) 02/27/2019     (H) 02/27/2019    AST 23 02/27/2019    ALT 26 02/27/2019        Counseling was given to patient for the following topics: disease prevention.    Plan of care reviewed with patient at the conclusion of today's visit. Education was provided regarding diagnosis, management, and any prescribed or recommended OTC medications.Patient verbalizes understanding of and agreement with management plan.         Max Tadeo MD    Note: Part of this note may be an electronic transcription/translation of spoken language to printed text using Dragon Dictation System.

## 2020-03-16 NOTE — ASSESSMENT & PLAN NOTE
Patient has a history of psoriatic arthritis inflammatory arthritis primarily left foot denies skin lesions is seen by Dr. Glass rheumat[facility] currently on no therapy other than Celebrex of which the patient has not been taking is currently stable with only minor discomfort left foot patient has had some discomfort in his right and left posterior shoulder that he is wonders was related to the Crestor this occurs only in the morning when he arises from bed denies any injury do not believe this is related to the Crestor.

## 2020-03-17 ENCOUNTER — LAB (OUTPATIENT)
Dept: LAB | Facility: HOSPITAL | Age: 40
End: 2020-03-17

## 2020-03-17 DIAGNOSIS — M19.90 INFLAMMATORY ARTHRITIS: ICD-10-CM

## 2020-03-17 DIAGNOSIS — E78.2 MIXED HYPERLIPIDEMIA: ICD-10-CM

## 2020-03-17 LAB
ALBUMIN SERPL-MCNC: 4.7 G/DL (ref 3.5–5.2)
ALBUMIN/GLOB SERPL: 1.5 G/DL
ALP SERPL-CCNC: 89 U/L (ref 39–117)
ALT SERPL W P-5'-P-CCNC: 42 U/L (ref 1–41)
ANION GAP SERPL CALCULATED.3IONS-SCNC: 11.6 MMOL/L (ref 5–15)
AST SERPL-CCNC: 26 U/L (ref 1–40)
BASOPHILS # BLD AUTO: 0.05 10*3/MM3 (ref 0–0.2)
BASOPHILS NFR BLD AUTO: 1 % (ref 0–1.5)
BILIRUB SERPL-MCNC: 0.8 MG/DL (ref 0.2–1.2)
BUN BLD-MCNC: 8 MG/DL (ref 6–20)
BUN/CREAT SERPL: 8.5 (ref 7–25)
CALCIUM SPEC-SCNC: 10 MG/DL (ref 8.6–10.5)
CHLORIDE SERPL-SCNC: 104 MMOL/L (ref 98–107)
CHOLEST SERPL-MCNC: 119 MG/DL (ref 0–200)
CO2 SERPL-SCNC: 25.4 MMOL/L (ref 22–29)
CREAT BLD-MCNC: 0.94 MG/DL (ref 0.76–1.27)
DEPRECATED RDW RBC AUTO: 38.8 FL (ref 37–54)
EOSINOPHIL # BLD AUTO: 0.34 10*3/MM3 (ref 0–0.4)
EOSINOPHIL NFR BLD AUTO: 6.7 % (ref 0.3–6.2)
ERYTHROCYTE [DISTWIDTH] IN BLOOD BY AUTOMATED COUNT: 12.2 % (ref 12.3–15.4)
GFR SERPL CREATININE-BSD FRML MDRD: 108 ML/MIN/1.73
GFR SERPL CREATININE-BSD FRML MDRD: 89 ML/MIN/1.73
GLOBULIN UR ELPH-MCNC: 3.1 GM/DL
GLUCOSE BLD-MCNC: 93 MG/DL (ref 65–99)
HCT VFR BLD AUTO: 46.3 % (ref 37.5–51)
HDLC SERPL-MCNC: 26 MG/DL (ref 40–60)
HGB BLD-MCNC: 15.2 G/DL (ref 13–17.7)
IMM GRANULOCYTES # BLD AUTO: 0.02 10*3/MM3 (ref 0–0.05)
IMM GRANULOCYTES NFR BLD AUTO: 0.4 % (ref 0–0.5)
LDLC SERPL CALC-MCNC: 69 MG/DL (ref 0–100)
LDLC/HDLC SERPL: 2.67 {RATIO}
LYMPHOCYTES # BLD AUTO: 2.16 10*3/MM3 (ref 0.7–3.1)
LYMPHOCYTES NFR BLD AUTO: 42.8 % (ref 19.6–45.3)
MCH RBC QN AUTO: 29 PG (ref 26.6–33)
MCHC RBC AUTO-ENTMCNC: 32.8 G/DL (ref 31.5–35.7)
MCV RBC AUTO: 88.2 FL (ref 79–97)
MONOCYTES # BLD AUTO: 0.6 10*3/MM3 (ref 0.1–0.9)
MONOCYTES NFR BLD AUTO: 11.9 % (ref 5–12)
NEUTROPHILS # BLD AUTO: 1.88 10*3/MM3 (ref 1.7–7)
NEUTROPHILS NFR BLD AUTO: 37.2 % (ref 42.7–76)
NRBC BLD AUTO-RTO: 0.2 /100 WBC (ref 0–0.2)
PLATELET # BLD AUTO: 331 10*3/MM3 (ref 140–450)
PMV BLD AUTO: 9.7 FL (ref 6–12)
POTASSIUM BLD-SCNC: 4.3 MMOL/L (ref 3.5–5.2)
PROT SERPL-MCNC: 7.8 G/DL (ref 6–8.5)
RBC # BLD AUTO: 5.25 10*6/MM3 (ref 4.14–5.8)
SODIUM BLD-SCNC: 141 MMOL/L (ref 136–145)
TRIGL SERPL-MCNC: 118 MG/DL (ref 0–150)
TSH SERPL DL<=0.05 MIU/L-ACNC: 1.73 UIU/ML (ref 0.27–4.2)
VLDLC SERPL-MCNC: 23.6 MG/DL (ref 5–40)
WBC NRBC COR # BLD: 5.05 10*3/MM3 (ref 3.4–10.8)

## 2020-03-17 PROCEDURE — 80053 COMPREHEN METABOLIC PANEL: CPT

## 2020-03-17 PROCEDURE — 84443 ASSAY THYROID STIM HORMONE: CPT

## 2020-03-17 PROCEDURE — 80061 LIPID PANEL: CPT

## 2020-03-17 PROCEDURE — 85025 COMPLETE CBC W/AUTO DIFF WBC: CPT

## 2020-03-17 NOTE — PATIENT INSTRUCTIONS
Return for fasting lab  Current medications  Rule to [facility] for mole left axillary right posterior leg  Return visit in 1 year PRN

## 2020-11-23 PROCEDURE — U0004 COV-19 TEST NON-CDC HGH THRU: HCPCS | Performed by: NURSE PRACTITIONER

## 2021-02-12 ENCOUNTER — TELEPHONE (OUTPATIENT)
Dept: INTERNAL MEDICINE | Facility: CLINIC | Age: 41
End: 2021-02-12

## 2021-02-12 RX ORDER — CYCLOBENZAPRINE HCL 10 MG
10 TABLET ORAL 2 TIMES DAILY PRN
Qty: 30 TABLET | Refills: 0 | Status: SHIPPED | OUTPATIENT
Start: 2021-02-12 | End: 2021-03-22

## 2021-02-12 NOTE — TELEPHONE ENCOUNTER
Pt denies injury. States that he woke up this morning with very bad low back pain. It is now so painful that he isn't able to get up and pain is worse when he moves and sits. Denies radiating pain, just in the low back. States he has taken some Tylenol but it hasn't helped.

## 2021-02-12 NOTE — TELEPHONE ENCOUNTER
Caller: Reymundo Celeste    Relationship: self    Best call back number: [phone]    What medication are you requesting: something for back pain, muscle relaxer    What are your current symptoms: back pain    How long have you been experiencing symptoms: 02/08/21    Have you had these symptoms before:    [x] Yes  [] No    Have you been treated for these symptoms before:   [x] Yes  [] No    If a prescription is needed, what is your preferred pharmacy and phone number:    [facility] [place], [place] [phone]    Additional notes:

## 2021-02-19 ENCOUNTER — TELEPHONE (OUTPATIENT)
Dept: INTERNAL MEDICINE | Facility: CLINIC | Age: 41
End: 2021-02-19

## 2021-02-19 NOTE — TELEPHONE ENCOUNTER
PATIENT CALLED AND STATED THAT LAST WEEK HE WAS GIVEN     cyclobenzaprine (FLEXERIL) 10 MG tablet    FOR BACK PAIN AND THE MEDICATION IS NOT HELPING AND HIS STILL IN A LOT OF PAIN AND WOULD LIKE TO HAVE A DIFFERENT MEDICATION CALLED INTO THE PHARMACY.       MATE[facility] SHETT[facility] [address] [place] [address] 07[address] [facility] - [phone] [facility] [phone]   [phone]    CALL BACK: [phone]

## 2021-02-23 ENCOUNTER — OFFICE VISIT (OUTPATIENT)
Dept: INTERNAL MEDICINE | Facility: CLINIC | Age: 41
End: 2021-02-23

## 2021-02-23 VITALS
HEIGHT: 72 IN | HEART RATE: 88 BPM | DIASTOLIC BLOOD PRESSURE: 100 MMHG | WEIGHT: 185 LBS | TEMPERATURE: 97.8 F | SYSTOLIC BLOOD PRESSURE: 138 MMHG | BODY MASS INDEX: 25.06 KG/M2

## 2021-02-23 DIAGNOSIS — E78.2 MIXED HYPERLIPIDEMIA: ICD-10-CM

## 2021-02-23 DIAGNOSIS — S39.012A BACK STRAIN, INITIAL ENCOUNTER: ICD-10-CM

## 2021-02-23 DIAGNOSIS — S39.012A STRAIN OF LUMBAR REGION, INITIAL ENCOUNTER: Primary | ICD-10-CM

## 2021-02-23 PROCEDURE — 99213 OFFICE O/P EST LOW 20 MIN: CPT | Performed by: INTERNAL MEDICINE

## 2021-02-23 RX ORDER — HYDROCODONE BITARTRATE AND ACETAMINOPHEN 5; 325 MG/1; MG/1
1 TABLET ORAL EVERY 8 HOURS PRN
Qty: 15 TABLET | Refills: 0 | Status: SHIPPED | OUTPATIENT
Start: 2021-02-23 | End: 2021-03-22

## 2021-02-23 NOTE — PROGRESS NOTES
[facility]     Reymundo Celeste  1980   [id number]      Patient Care Team:  Max Tadeo MD as PCP - General (Internal Medicine)  Dariel Marie MD as PCP - Family Medicine  Bennett Glass MD as Consulting Physician (Rheumatology)    Chief Complaint::   Chief Complaint   Patient presents with   • Back Pain     x 2 weeks.  Believes he bent over wrong.  Pain with ROM.  Has started physical therapy.  pain worse with sitting            HPI  Patient is 40-year-old male with a history of mixed hyperlipidemia not on therapy with acute back strain on February 12 while reaching down on the floor with severe back pain similar to episode of approximately 3 years ago.  The patient called and was prescribed Flexeril and Advil but is slightly improved he has been seeing physical therapy at [facility] physical therapy by [facility] having had 2 sessions slightly improved patient continues with.  Low back pain with left-sided discomfort in the thigh when sitting.      Patient Active Problem List   Diagnosis   • Inflammatory arthritis   • History of meniscal tear   • History of kidney stones   • Mixed hyperlipidemia   • Vitamin D deficiency   • Numerous skin moles   • Low back strain        Past Medical History:   Diagnosis Date   • History of tear of meniscus of knee joint     injection   • Hyperlipidemia    • Inflammatory arthritis    • Kidney stone    • Vitamin D deficiency        History reviewed. No pertinent surgical history.    Family History   Problem Relation Age of Onset   • Cancer Mother         pancreas   • Diabetes Mother    • Hypertension Mother    • Hypertension Father        Social History     Socioeconomic History   • Marital status:      Spouse name: Not on file   • Number of children: Not on file   • Years of education: Not on file   • Highest education level: Not on file   Tobacco Use   • Smoking status: Never Smoker   • Smokeless tobacco: Never Used   Substance and Sexual Activity  "  • Alcohol use: No   • Drug use: No   • Sexual activity: Defer       No Known Allergies    Review of Systems   Constitutional: Negative for chills, fatigue and fever.   HENT: Negative for congestion, ear pain and sinus pressure.    Respiratory: Negative for cough, chest tightness, shortness of breath and wheezing.    Cardiovascular: Negative for chest pain and palpitations.   Gastrointestinal: Negative for abdominal pain, blood in stool and constipation.   Musculoskeletal: Positive for back pain.   Skin: Negative for color change.   Allergic/Immunologic: Negative for environmental allergies.   Neurological: Negative for dizziness, speech difficulty and headache.   Psychiatric/Behavioral: Negative for decreased concentration. The patient is not nervous/anxious.             Vital Signs  Vitals:    02/23/21 1323   BP: 138/100   BP Location: Left arm   Patient Position: Standing   Cuff Size: Adult   Pulse: 88   Temp: 97.8 °F (36.6 °C)   Weight: 83.9 kg (185 lb)   Height: 182.9 cm (72\")   PainSc:   6   PainLoc: Back     Body mass index is 25.09 kg/m².  Patient's Body mass index is 25.09 kg/m². BMI is within normal parameters. No follow-up required..     Advance Care Planning         Current Outpatient Medications:   •  cyclobenzaprine (FLEXERIL) 10 MG tablet, Take 1 tablet by mouth 2 (Two) Times a Day As Needed for Muscle Spasms., Disp: 30 tablet, Rfl: 0  •  HYDROcodone-acetaminophen (Norco) 5-325 MG per tablet, Take 1 tablet by mouth Every 8 (Eight) Hours As Needed for Severe Pain ., Disp: 15 tablet, Rfl: 0  •  methylPREDNISolone (Medrol) 2 MG tablet, Take 2 tabs twice a day for 2 days ,then one tab twice daily for 2 days ,then one tab daily for 2 days, Disp: 14 tablet, Rfl: 0  •  rosuvastatin (CRESTOR) 20 MG tablet, Take 1 tablet by mouth Daily., Disp: 30 tablet, Rfl: 5    Physical Exam     ACE III MINI         HEENT: No asymmetry pharynx is clear  NECK: No mass bruit thyromegaly neck vein distention  CHEST: " Clear  CARDIAC: Regular rhythm with normal blood pressure and heart rate  ABD: Normal  : Deferred  NEURO: Intact  PSYCH: Normal  EXTREM: Lumbar spine degeneration with reduced range of motion negative straight leg raising left and right decreased patellar reflexes left and right  Skin: Clear     Results Review:    No results found for this or any previous visit (from the past 672 hour(s)).  Procedures    Medication Review: Medications reviewed and noted    Patient wellness counseling  Exercise: Encouraged follow-up with physical therapy  Diet: Healthy cardiac diet  Smoking: Non-smoker  Alcohol:  Screening:    Assessment/Plan:    Problem List Items Addressed This Visit        Cardiac and Vasculature    Mixed hyperlipidemia    Overview     History of mixed hyperlipidemia on Crestor 20 mg daily denies myalgia arthralgia         Current Assessment & Plan     Mixed hyperlipidemia previously on Crestor 20 mg currently not on therapy         Relevant Medications    rosuvastatin (CRESTOR) 20 MG tablet       Musculoskeletal and Injuries    Low back strain - Primary    Overview     Patient states he was reaching down to obtain an object on floor and felt his back with severe pain and discomfort on Friday, 12 February the pain was similar to a low back injury approximately 3 years ago the pain is primarily mid back with some left-sided weakness and radiation when he is sitting he feels its acute strain, he called his office and was given Flexeril twice daily and Advil twice daily on February 12 the patient has been receiving physical therapy at [facility] physical therapy on February 22 in February 19 with follow-up in 2 days he states his back is slightly improved on the Flexeril and Advil but occasionally has exacerbations of discomfort and pain.  Suggest continue therapy with physical therapy and will add Medrol 4 mg twice daily for 2 days 2 mg twice daily for 2 days and 2 mg daily for 2 days, and related hydrocodone 5/325  number fifteen 1 tablet daily 12 to 8 hours as needed         Current Assessment & Plan     Acute back strain on Friday, February 12 treated with Advil and Flexeril and rest has received physical therapy of February 19 February 22 slightly improved but still symptomatic  We will add methylprednisolone 4 mg twice daily for 2 days, 2 mg twice daily for 2 days and 2 mg daily for 2 days  We will add hydrocodone 5/325 1 tablet every 8-12 hours as needed #15 prescribed  To follow-up with physical therapy  Exam reveals negative straight leg raising left and right leg but reduced range of motion of lumbar spine with reduced lateral movement and AP flexion           Other Visit Diagnoses     Back strain, initial encounter        Relevant Medications    HYDROcodone-acetaminophen (Norco) 5-325 MG per tablet           Patient Instructions   Continue follow-up with physical therapy at [facility]  Continue Flexeril and Advil  Add methylprednisolone reducing doses  Add hydrocodone 5/325 #15 1 every 8-12 hours as needed pain  Follow-up visit March 22, 2021 or as needed       Plan of care reviewed with patient at the conclusion of today's visit. Education was provided regarding diagnosis, management, and any prescribed or recommended OTC medications.Patient verbalizes understanding of and agreement with management plan.         Max Tadeo MD      Note: Part of this note may be an electronic transcription/translation of spoken language to printed text using the Dragon Dictation system.

## 2021-02-23 NOTE — ASSESSMENT & PLAN NOTE
Acute back strain on Friday, February 12 treated with Advil and Flexeril and rest has received physical therapy of February 19 February 22 slightly improved but still symptomatic  We will add methylprednisolone 4 mg twice daily for 2 days, 2 mg twice daily for 2 days and 2 mg daily for 2 days  We will add hydrocodone 5/325 1 tablet every 8-12 hours as needed #15 prescribed  To follow-up with physical therapy  Exam reveals negative straight leg raising left and right leg but reduced range of motion of lumbar spine with reduced lateral movement and AP flexion

## 2021-02-23 NOTE — PATIENT INSTRUCTIONS
Continue follow-up with physical therapy at [facility]  Continue Flexeril and Advil  Add methylprednisolone reducing doses  Add hydrocodone 5/325 #15 1 every 8-12 hours as needed pain  Follow-up visit March 22, 2021 or as needed

## 2021-03-22 ENCOUNTER — LAB (OUTPATIENT)
Dept: LAB | Facility: HOSPITAL | Age: 41
End: 2021-03-22

## 2021-03-22 ENCOUNTER — OFFICE VISIT (OUTPATIENT)
Dept: INTERNAL MEDICINE | Facility: CLINIC | Age: 41
End: 2021-03-22

## 2021-03-22 ENCOUNTER — HOSPITAL ENCOUNTER (OUTPATIENT)
Dept: GENERAL RADIOLOGY | Facility: HOSPITAL | Age: 41
Discharge: HOME | End: 2021-03-22

## 2021-03-22 VITALS
BODY MASS INDEX: 24.65 KG/M2 | HEIGHT: 72 IN | HEART RATE: 82 BPM | TEMPERATURE: 97.3 F | WEIGHT: 182 LBS | DIASTOLIC BLOOD PRESSURE: 88 MMHG | SYSTOLIC BLOOD PRESSURE: 112 MMHG

## 2021-03-22 DIAGNOSIS — E78.2 MIXED HYPERLIPIDEMIA: ICD-10-CM

## 2021-03-22 DIAGNOSIS — R07.89 OTHER CHEST PAIN: ICD-10-CM

## 2021-03-22 DIAGNOSIS — Z00.00 PREVENTATIVE HEALTH CARE: Primary | ICD-10-CM

## 2021-03-22 DIAGNOSIS — S39.012S STRAIN OF LUMBAR REGION, SEQUELA: ICD-10-CM

## 2021-03-22 DIAGNOSIS — S39.012A BACK STRAIN, INITIAL ENCOUNTER: ICD-10-CM

## 2021-03-22 DIAGNOSIS — Z87.442 HISTORY OF KIDNEY STONES: ICD-10-CM

## 2021-03-22 LAB
ALBUMIN SERPL-MCNC: 4.6 G/DL (ref 3.5–5.2)
ALBUMIN/GLOB SERPL: 1.4 G/DL
ALP SERPL-CCNC: 111 U/L (ref 39–117)
ALT SERPL W P-5'-P-CCNC: 36 U/L (ref 1–41)
ANION GAP SERPL CALCULATED.3IONS-SCNC: 9.3 MMOL/L (ref 5–15)
AST SERPL-CCNC: 23 U/L (ref 1–40)
BACTERIA UR QL AUTO: NORMAL /HPF
BASOPHILS # BLD AUTO: 0.04 10*3/MM3 (ref 0–0.2)
BASOPHILS NFR BLD AUTO: 0.8 % (ref 0–1.5)
BILIRUB SERPL-MCNC: 0.5 MG/DL (ref 0–1.2)
BILIRUB UR QL STRIP: NEGATIVE
BUN SERPL-MCNC: 9 MG/DL (ref 6–20)
BUN/CREAT SERPL: 9.3 (ref 7–25)
CALCIUM SPEC-SCNC: 9.5 MG/DL (ref 8.6–10.5)
CHLORIDE SERPL-SCNC: 106 MMOL/L (ref 98–107)
CHOLEST SERPL-MCNC: 220 MG/DL (ref 0–200)
CLARITY UR: CLEAR
CO2 SERPL-SCNC: 25.7 MMOL/L (ref 22–29)
COLOR UR: YELLOW
CREAT SERPL-MCNC: 0.97 MG/DL (ref 0.76–1.27)
DEPRECATED RDW RBC AUTO: 39.4 FL (ref 37–54)
EOSINOPHIL # BLD AUTO: 0.32 10*3/MM3 (ref 0–0.4)
EOSINOPHIL NFR BLD AUTO: 6.3 % (ref 0.3–6.2)
ERYTHROCYTE [DISTWIDTH] IN BLOOD BY AUTOMATED COUNT: 12.2 % (ref 12.3–15.4)
GFR SERPL CREATININE-BSD FRML MDRD: 103 ML/MIN/1.73
GFR SERPL CREATININE-BSD FRML MDRD: 85 ML/MIN/1.73
GLOBULIN UR ELPH-MCNC: 3.2 GM/DL
GLUCOSE SERPL-MCNC: 100 MG/DL (ref 65–99)
GLUCOSE UR STRIP-MCNC: NEGATIVE MG/DL
HCT VFR BLD AUTO: 45.9 % (ref 37.5–51)
HDLC SERPL-MCNC: 27 MG/DL (ref 40–60)
HGB BLD-MCNC: 15.2 G/DL (ref 13–17.7)
HGB UR QL STRIP.AUTO: ABNORMAL
HYALINE CASTS UR QL AUTO: NORMAL /LPF
IMM GRANULOCYTES # BLD AUTO: 0.03 10*3/MM3 (ref 0–0.05)
IMM GRANULOCYTES NFR BLD AUTO: 0.6 % (ref 0–0.5)
KETONES UR QL STRIP: NEGATIVE
LDLC SERPL CALC-MCNC: 163 MG/DL (ref 0–100)
LDLC/HDLC SERPL: 5.97 {RATIO}
LEUKOCYTE ESTERASE UR QL STRIP.AUTO: ABNORMAL
LYMPHOCYTES # BLD AUTO: 2.11 10*3/MM3 (ref 0.7–3.1)
LYMPHOCYTES NFR BLD AUTO: 41.9 % (ref 19.6–45.3)
MCH RBC QN AUTO: 29.5 PG (ref 26.6–33)
MCHC RBC AUTO-ENTMCNC: 33.1 G/DL (ref 31.5–35.7)
MCV RBC AUTO: 89.1 FL (ref 79–97)
MONOCYTES # BLD AUTO: 0.65 10*3/MM3 (ref 0.1–0.9)
MONOCYTES NFR BLD AUTO: 12.9 % (ref 5–12)
NEUTROPHILS NFR BLD AUTO: 1.89 10*3/MM3 (ref 1.7–7)
NEUTROPHILS NFR BLD AUTO: 37.5 % (ref 42.7–76)
NITRITE UR QL STRIP: NEGATIVE
NRBC BLD AUTO-RTO: 0 /100 WBC (ref 0–0.2)
PH UR STRIP.AUTO: 5.5 [PH] (ref 5–8)
PLATELET # BLD AUTO: 335 10*3/MM3 (ref 140–450)
PMV BLD AUTO: 9.7 FL (ref 6–12)
POTASSIUM SERPL-SCNC: 4.2 MMOL/L (ref 3.5–5.2)
PROT SERPL-MCNC: 7.8 G/DL (ref 6–8.5)
PROT UR QL STRIP: ABNORMAL
RBC # BLD AUTO: 5.15 10*6/MM3 (ref 4.14–5.8)
RBC # UR: NORMAL /HPF
REF LAB TEST METHOD: NORMAL
SODIUM SERPL-SCNC: 141 MMOL/L (ref 136–145)
SP GR UR STRIP: 1.02 (ref 1–1.03)
SQUAMOUS #/AREA URNS HPF: NORMAL /HPF
TRIGL SERPL-MCNC: 159 MG/DL (ref 0–150)
UROBILINOGEN UR QL STRIP: ABNORMAL
VLDLC SERPL-MCNC: 30 MG/DL (ref 5–40)
WBC # BLD AUTO: 5.04 10*3/MM3 (ref 3.4–10.8)
WBC UR QL AUTO: NORMAL /HPF

## 2021-03-22 PROCEDURE — 99396 PREV VISIT EST AGE 40-64: CPT | Performed by: INTERNAL MEDICINE

## 2021-03-22 PROCEDURE — 80053 COMPREHEN METABOLIC PANEL: CPT

## 2021-03-22 PROCEDURE — 81001 URINALYSIS AUTO W/SCOPE: CPT

## 2021-03-22 PROCEDURE — 85025 COMPLETE CBC W/AUTO DIFF WBC: CPT

## 2021-03-22 PROCEDURE — 71046 X-RAY EXAM CHEST 2 VIEWS: CPT

## 2021-03-22 PROCEDURE — 72114 X-RAY EXAM L-S SPINE BENDING: CPT

## 2021-03-22 PROCEDURE — 80061 LIPID PANEL: CPT

## 2021-03-22 NOTE — PROGRESS NOTES
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  Past history of mixed hyperlipidemia previously on Crestor not on therapy at this time and remote history of meniscal tear of the knee with some minor arthritic changes and history of kidney stones data deficient.  Patient is a non-smoker nonalcohol drinker does not have a living will he overall feels well denying headache or dizzy cough or wheeze chest pain or pressure nausea vomiting or significant extremity difficulty.    Patient Active Problem List   Diagnosis   • Inflammatory arthritis   • History of meniscal tear   • History of kidney stones   • Mixed hyperlipidemia   • Vitamin D deficiency   • Numerous skin moles   • Low back strain   • Preventative health care        Past Medical History:   Diagnosis Date   • History of tear of meniscus of knee joint     injection   • Hyperlipidemia    • Inflammatory arthritis    • Kidney stone    • Vitamin D deficiency        History reviewed. No pertinent surgical history.    Family History   Problem Relation Age of Onset   • Cancer Mother         pancreas   • Diabetes Mother    • Hypertension Mother    • Hypertension Father        Social History     Socioeconomic History   • Marital status:      Spouse name: Not on file   •  "Number of children: Not on file   • Years of education: Not on file   • Highest education level: Not on file   Tobacco Use   • Smoking status: Never Smoker   • Smokeless tobacco: Never Used   Substance and Sexual Activity   • Alcohol use: No   • Drug use: No   • Sexual activity: Defer       No Known Allergies    Immunization History   Administered Date(s) Administered   • COVID-19 (PFIZER) 01/29/2021, 02/24/2021        Health Maintenance Due   Topic Date Due   • TDAP/TD VACCINES (1 - Tdap) Never done   • HEPATITIS C SCREENING  Never done   • INFLUENZA VACCINE  08/01/2020        Review of Systems   Constitutional: Negative for chills, fatigue and fever.   HENT: Negative for congestion, ear pain and sinus pressure.    Respiratory: Negative for cough, chest tightness, shortness of breath and wheezing.    Cardiovascular: Negative for chest pain and palpitations.   Gastrointestinal: Negative for abdominal pain, blood in stool and constipation.   Musculoskeletal: Positive for back pain.   Skin: Negative for color change.   Allergic/Immunologic: Negative for environmental allergies.   Neurological: Negative for dizziness, speech difficulty and headache.   Psychiatric/Behavioral: Negative for decreased concentration. The patient is not nervous/anxious.           Vital Signs  Vitals:    03/22/21 0912   BP: 112/88   BP Location: Left arm   Patient Position: Sitting   Cuff Size: Adult   Pulse: 82   Temp: 97.3 °F (36.3 °C)   Weight: 82.6 kg (182 lb)   Height: 182.9 cm (72\")   PainSc: 0-No pain     Body mass index is 24.68 kg/m².  Patient's Body mass index is 24.68 kg/m². BMI is within normal parameters. No follow-up required..     Advance Care Planning         Current Outpatient Medications:   •  rosuvastatin (CRESTOR) 20 MG tablet, Take 1 tablet by mouth Daily., Disp: 30 tablet, Rfl: 5    Physical Exam   HEENT: Pupils equal reactive ENT clear no facial asymmetry pharynx is clear  NECK: No mass bruit thyromegaly vein " distention  CHEST: Clear without rales or wheezing  CARDIAC: Regular rhythm no gallop or rub blood pressure heart rate stable  ABD: Liver spleen normal positive bowel sounds no bruit  : Deferred  NEURO: Intact  PSYCH: Normal  EXTREM: Arthritic changes no edema very mild curvature of scoliosis of the upper lumbar lower thoracic vertebrae good range of motion some soreness on the left  Skin: Clear no rash     Results Review:    Fasting labs pending  Procedures Patient Wellness Counseling:   Plan of care reviewed with patient at the conclusion of today's visit. Education was provided in regards to diagnosis, diet and exercise, prostate cancer screening discussed including benefit of early detection, potential need for follow-up, and harms associated with additional management. Patient agrees to screening.    Nutrition, physical activity, healthy weight,ways to reduce stress, adequate sleep, injury prevention, misuse of tobacco, alcohol and drugs, sexual behavior and STD's, dental health, mental health, and immunizations.    Plan of care reviewed with patient at the conclusion of today's visit. Education was provided regarding diagnosis, management and any prescribed or recommended OTC medications.  Patient verbalizes understanding of and agreement with management plan.        Medication Review: Medications reviewed and noted    Patient wellness counseling  Exercise: Encouraged regular exercise  Diet: Healthy cardiac diet  Smoking: Non-smoker  Alcohol: Nondrinker  Screening: Fasting labs pending chest x-ray and lumbar spine x-rays pending    Assessment/Plan:  Problem List Items Addressed This Visit        Cardiac and Vasculature    Mixed hyperlipidemia    Overview     History of mixed hyperlipidemia on Crestor 20 mg daily denies myalgia arthralgia         Current Assessment & Plan       Mixed hyperlipidemia start history and currently not on therapy fasting CMP and lipid are pending  Noted previously on Crestor 20 mg  daily         Relevant Medications    rosuvastatin (CRESTOR) 20 MG tablet    Other Relevant Orders    CBC & Differential (Completed)    Comprehensive Metabolic Panel (Completed)    Lipid Panel (Completed)    XR Chest PA & Lateral (Completed)    XR Spine Lumbar Complete With Flex & Ext (Completed)       Genitourinary and Reproductive     History of kidney stones    Overview     Patient complains of left low back pain         Current Assessment & Plan     Patient complains of left low back pain without radiation denies dysuria or  problems will obtain CBC CMP and urine analysis for possible blood as etiology of low back pain            Health Encounters    Preventative health care - Primary    Overview     Patient 40-year-old male presents for preventative visit and physical exam            Musculoskeletal and Injuries    Low back strain    Overview     Patient states he was reaching down to obtain an object on floor and felt his back with severe pain and discomfort on Friday, 12 February the pain was similar to a low back injury approximately 3 years ago the pain is primarily mid back with some left-sided weakness and radiation when he is sitting he feels its acute strain, he called his office and was given Flexeril twice daily and Advil twice daily on February 12 the patient has been receiving physical therapy at [facility] physical therapy on February 22 in February 19 with follow-up in 2 days he states his back is slightly improved on the Flexeril and Advil but occasionally has exacerbations of discomfort and pain.  Suggest continue therapy with physical therapy and will add Medrol 4 mg twice daily for 2 days 2 mg twice daily for 2 days and 2 mg daily for 2 days, and related hydrocodone 5/325 number fifteen 1 tablet daily 12 to 8 hours as needed         Current Assessment & Plan     Persistent pain in the low back with some left radiation and upper left mid back along the rib cage he has had physical therapy which  has been of benefit general exam is negative for straight leg raising minor reduced range of motion a very mild scoliosis is seen on exam of the lumbar spine and thoracic spine no rashes no point tenderness will obtain chest x-ray to look at the left posterior ribs in the spine and will obtain lumbar spine x-ray in the meantime can visit new physical therapy and suggest ibuprofen 200 twice daily with food.           Other Visit Diagnoses     Back strain, initial encounter        Relevant Orders    CBC & Differential (Completed)    Comprehensive Metabolic Panel (Completed)    Lipid Panel (Completed)    XR Chest PA & Lateral (Completed)    XR Spine Lumbar Complete With Flex & Ext (Completed)    Urinalysis With Microscopic - Urine, Clean Catch (Completed)    Other chest pain        Relevant Orders    CBC & Differential (Completed)    Comprehensive Metabolic Panel (Completed)    Lipid Panel (Completed)    XR Chest PA & Lateral (Completed)    XR Spine Lumbar Complete With Flex & Ext (Completed)           Patient Instructions   Fasting lab pending  Chest x-ray pending  Lumbar spine x-ray pending  Encouraged ibuprofen 200 mg twice daily  Continue follow-up with physical therapy at [facility] physical therapy  Pending x-rays return visit in 3 months or as needed       CMP:  Lab Results   Component Value Date    BUN 9 03/22/2021    CREATININE 0.97 03/22/2021    EGFRIFNONA 85 03/22/2021    EGFRIFAFRI 103 03/22/2021    BCR 9.3 03/22/2021     03/22/2021    K 4.2 03/22/2021    CO2 25.7 03/22/2021    CALCIUM 9.5 03/22/2021    ALBUMIN 4.60 03/22/2021    BILITOT 0.5 03/22/2021    ALKPHOS 111 03/22/2021    AST 23 03/22/2021    ALT 36 03/22/2021     HbA1c:  No results found for: HGBA1C  Microalbumin:  No results found for: MICROALBUR, POCMALB, POCCREAT  Lipid Panel  Lab Results   Component Value Date    CHOL 220 (H) 03/22/2021    TRIG 159 (H) 03/22/2021    HDL 27 (L) 03/22/2021     (H) 03/22/2021    AST 23 03/22/2021    ALT  36 03/22/2021        Counseling was given to patient for the following topics: disease prevention.    Plan of care reviewed with patient at the conclusion of today's visit. Education was provided regarding diagnosis, management, and any prescribed or recommended OTC medications.Patient verbalizes understanding of and agreement with management plan.         Max Tadeo MD    Note: Part of this note may be an electronic transcription/translation of spoken language to printed text using Dragon Dictation System.

## 2021-03-22 NOTE — ASSESSMENT & PLAN NOTE
Persistent pain in the low back with some left radiation and upper left mid back along the rib cage he has had physical therapy which has been of benefit general exam is negative for straight leg raising minor reduced range of motion a very mild scoliosis is seen on exam of the lumbar spine and thoracic spine no rashes no point tenderness will obtain chest x-ray to look at the left posterior ribs in the spine and will obtain lumbar spine x-ray in the meantime can visit new physical therapy and suggest ibuprofen 200 twice daily with food.

## 2021-03-22 NOTE — ASSESSMENT & PLAN NOTE
Patient complains of left low back pain without radiation denies dysuria or  problems will obtain CBC CMP and urine analysis for possible blood as etiology of low back pain

## 2021-03-22 NOTE — ASSESSMENT & PLAN NOTE
Mixed hyperlipidemia start history and currently not on therapy fasting CMP and lipid are pending  Noted previously on Crestor 20 mg daily

## 2021-03-22 NOTE — PATIENT INSTRUCTIONS
Fasting lab pending  Chest x-ray pending  Lumbar spine x-ray pending  Encouraged ibuprofen 200 mg twice daily  Continue follow-up with physical therapy at [facility] physical therapy  Pending x-rays return visit in 3 months or as needed

## 2021-07-14 ENCOUNTER — OFFICE VISIT (OUTPATIENT)
Dept: INTERNAL MEDICINE | Facility: CLINIC | Age: 41
End: 2021-07-14

## 2021-07-14 VITALS
WEIGHT: 177 LBS | SYSTOLIC BLOOD PRESSURE: 112 MMHG | BODY MASS INDEX: 23.98 KG/M2 | DIASTOLIC BLOOD PRESSURE: 82 MMHG | HEART RATE: 60 BPM | HEIGHT: 72 IN | TEMPERATURE: 97.3 F

## 2021-07-14 DIAGNOSIS — E78.2 MIXED HYPERLIPIDEMIA: ICD-10-CM

## 2021-07-14 DIAGNOSIS — S39.012S STRAIN OF LUMBAR REGION, SEQUELA: Primary | ICD-10-CM

## 2021-07-14 PROCEDURE — 99214 OFFICE O/P EST MOD 30 MIN: CPT | Performed by: INTERNAL MEDICINE

## 2021-07-14 RX ORDER — ROSUVASTATIN CALCIUM 20 MG/1
20 TABLET, COATED ORAL DAILY
Qty: 90 TABLET | Refills: 3 | Status: SHIPPED | OUTPATIENT
Start: 2021-07-14

## 2021-07-14 NOTE — ASSESSMENT & PLAN NOTE
Patient has discomfort in the back thought to be primarily muscle strain x-rays of the chest and lumbar spine showed mild degeneration primarily L4/5  The patient has been getting physical therapy at [place] [facility] with Emma and is markedly improved and continuing those exercises on a daily basis with visits by physical therapy once a month.  Suggest continued PT and those physical therapy exercises

## 2021-07-14 NOTE — PROGRESS NOTES
[facility]     Reymundo Celeste  1980   [id number]      Patient Care Team:  Max Tadeo MD as PCP - General (Internal Medicine)  Bennett Glass MD as Consulting Physician (Rheumatology)    Chief Complaint::   Chief Complaint   Patient presents with   • Hyperlipidemia     follow up            HPI  40-year-old male with mixed hyperlipidemia on Crestor 20 mg without myalgia arthralgia complaining of low back pain discomfort but normal chest x-ray and lumbar spine showing mild degeneration particularly at L4/5 he has been getting physical therapy from [place] [facility] by Emma he sees her approximately once per month and is doing his physical therapy exercises at home primarily stretching and mobility and is markedly improved.  Denies headache or dizzy cough or wheeze chest pain or pressure nausea vomiting.      Patient Active Problem List   Diagnosis   • Inflammatory arthritis   • History of meniscal tear   • History of kidney stones   • Mixed hyperlipidemia   • Vitamin D deficiency   • Numerous skin moles   • Low back strain   • Preventative health care        Past Medical History:   Diagnosis Date   • History of tear of meniscus of knee joint     injection   • Hyperlipidemia    • Inflammatory arthritis    • Kidney stone    • Vitamin D deficiency        History reviewed. No pertinent surgical history.    Family History   Problem Relation Age of Onset   • Cancer Mother         pancreas   • Diabetes Mother    • Hypertension Mother    • Hypertension Father        Social History     Socioeconomic History   • Marital status:      Spouse name: Not on file   • Number of children: Not on file   • Years of education: Not on file   • Highest education level: Not on file   Tobacco Use   • Smoking status: Never Smoker   • Smokeless tobacco: Never Used   Substance and Sexual Activity   • Alcohol use: No   • Drug use: No   • Sexual activity: Defer       No Known Allergies    Review of  "Systems     HEENT: Denies headache or dizzy  NECK: Denies dysphagia or stiffness  CHEST: Non-smoker denies cough or wheeze  CARDIAC: Denies chest pain palpitations  ABD: Denies nausea vomiting  : Denies dysuria  NEURO: Denies syncope concussion  PSYCH: Denies anxiety depression  EXTREM: Complains of low back discomfort    Vital Signs  Vitals:    07/14/21 1456   BP: 112/82   BP Location: Left arm   Patient Position: Sitting   Cuff Size: Adult   Pulse: 60   Temp: 97.3 °F (36.3 °C)   Weight: 80.3 kg (177 lb)   Height: 182.9 cm (72\")   PainSc: 0-No pain     Body mass index is 24.01 kg/m².  Patient's Body mass index is 24.01 kg/m². indicating that he is within normal range (BMI 18.5-24.9). No BMI management plan needed..     Advance Care Planning         Current Outpatient Medications:   •  rosuvastatin (CRESTOR) 20 MG tablet, Take 1 tablet by mouth Daily., Disp: 90 tablet, Rfl: 3    Physical Exam     ACE III MINI         HEENT: No asymmetry  NECK: No mass bruit or thyromegaly  CHEST: Clear  CARDIAC: Regular rhythm with normal blood pressure and heart rate  ABD: Liver spleen normal good bowel sounds  : Deferred  NEURO: Intact  PSYCH: Normal  EXTREM: Low back mild reduced range of motion improved with current physical therapy  Skin: Clear     Results Review:    No results found for this or any previous visit (from the past 672 hour(s)).  Procedures    Medication Review: Medications reviewed and noted    Patient wellness counseling  Exercise: Continue physical therapy exercises  Diet: Healthy cardiac diet  Smoking: Non-smoker  Alcohol: None alcohol  Screening:    Assessment/Plan:    Problem List Items Addressed This Visit        Cardiac and Vasculature    Mixed hyperlipidemia    Overview     History of mixed hyperlipidemia on Crestor 20 mg daily denies myalgia arthralgia         Current Assessment & Plan       Mixed hyperlipidemia on Crestor 20 mg daily continue therapy denies myalgia arthralgia         Relevant " Medications    rosuvastatin (CRESTOR) 20 MG tablet       Musculoskeletal and Injuries    Low back strain - Primary    Overview     Patient states he was reaching down to obtain an object on floor and felt his back with severe pain and discomfort on Friday, 12 February the pain was similar to a low back injury approximately 3 years ago the pain is primarily mid back with some left-sided weakness and radiation when he is sitting he feels its acute strain, he called his office and was given Flexeril twice daily and Advil twice daily on February 12 the patient has been receiving physical therapy at [facility] on February 22 in February 19 with follow-up in 2 days he states his back is slightly improved on the Flexeril and Advil but occasionally has exacerbations of discomfort and pain.  Suggest continue therapy with physical therapy and will add Medrol 4 mg twice daily for 2 days 2 mg twice daily for 2 days and 2 mg daily for 2 days, and related hydrocodone 5/325 number fifteen 1 tablet daily 12 to 8 hours as needed         Current Assessment & Plan     Patient has discomfort in the back thought to be primarily muscle strain x-rays of the chest and lumbar spine showed mild degeneration primarily L4/5  The patient has been getting physical therapy at [facility] with Emma and is markedly improved and continuing those exercises on a daily basis with visits by physical therapy once a month.  Suggest continued PT and those physical therapy exercises                Patient Instructions   Continue Crestor 20 mg.  Mixed hyperlipidemia  Continue physical therapy and home physical therapy  Continue cardiac healthy exercise and healthy cardiac diet  Return visit in 6 months or as needed         Plan of care reviewed with patient at the conclusion of today's visit. Education was provided regarding diagnosis, management, and any prescribed or recommended OTC medications.Patient verbalizes understanding  of and agreement with management plan.         Max Tadeo MD      Note: Part of this note may be an electronic transcription/translation of spoken language to printed text using the Dragon Dictation system.

## 2021-07-15 NOTE — PATIENT INSTRUCTIONS
Continue Crestor 20 mg.  Mixed hyperlipidemia  Continue physical therapy and home physical therapy  Continue cardiac healthy exercise and healthy cardiac diet  Return visit in 6 months or as needed

## 2023-10-03 RX ORDER — ROSUVASTATIN CALCIUM 20 MG/1
20 TABLET, COATED ORAL DAILY
Qty: 30 TABLET | Refills: 0 | Status: SHIPPED | OUTPATIENT
Start: 2023-10-03

## 2023-10-03 NOTE — TELEPHONE ENCOUNTER
Rx Refill Note  Requested Prescriptions     Pending Prescriptions Disp Refills    rosuvastatin (CRESTOR) 20 MG tablet 90 tablet 3     Sig: Take 1 tablet by mouth Daily.      Last office visit with prescribing clinician: 07/14/21   Last telemedicine visit with prescribing clinician: Visit date not found   Next office visit with prescribing clinician: 10/17/2023                         Would you like a call back once the refill request has been completed: [] Yes [] No    If the office needs to give you a call back, can they leave a voicemail: [] Yes [] No    Tammie Willson LPN  10/03/23, 14:04 EDT

## 2023-10-12 ENCOUNTER — PATIENT MESSAGE (OUTPATIENT)
Dept: INTERNAL MEDICINE | Facility: CLINIC | Age: 43
End: 2023-10-12
Payer: COMMERCIAL

## 2023-10-12 DIAGNOSIS — E55.9 VITAMIN D DEFICIENCY: ICD-10-CM

## 2023-10-12 DIAGNOSIS — M25.579 ARTHRALGIA OF TOE, UNSPECIFIED LATERALITY: ICD-10-CM

## 2023-10-12 DIAGNOSIS — E78.2 MIXED HYPERLIPIDEMIA: Primary | ICD-10-CM

## 2023-10-12 DIAGNOSIS — R73.01 IMPAIRED FASTING GLUCOSE: ICD-10-CM

## 2023-10-12 DIAGNOSIS — Z87.442 HISTORY OF KIDNEY STONES: ICD-10-CM

## 2023-10-12 NOTE — TELEPHONE ENCOUNTER
From: Reymundo Celeste  To: Max Tadeo  Sent: 10/12/2023 11:16 AM EDT  Subject: Test    I was wondering if I can go for my Blood Test before visiting on 17Oct2023.  As I will be visiting @ 3:00 PM I will not be fasting.

## 2023-10-15 PROBLEM — Z00.00 PREVENTATIVE HEALTH CARE: Status: ACTIVE | Noted: 2023-10-15

## 2023-10-17 ENCOUNTER — OFFICE VISIT (OUTPATIENT)
Dept: INTERNAL MEDICINE | Facility: CLINIC | Age: 43
End: 2023-10-17
Payer: COMMERCIAL

## 2023-10-17 ENCOUNTER — LAB (OUTPATIENT)
Dept: LAB | Facility: HOSPITAL | Age: 43
End: 2023-10-17
Payer: COMMERCIAL

## 2023-10-17 VITALS
DIASTOLIC BLOOD PRESSURE: 80 MMHG | SYSTOLIC BLOOD PRESSURE: 106 MMHG | HEIGHT: 72 IN | HEART RATE: 72 BPM | WEIGHT: 180 LBS | BODY MASS INDEX: 24.38 KG/M2

## 2023-10-17 DIAGNOSIS — E55.9 VITAMIN D DEFICIENCY: ICD-10-CM

## 2023-10-17 DIAGNOSIS — R31.21 ASYMPTOMATIC MICROSCOPIC HEMATURIA: ICD-10-CM

## 2023-10-17 DIAGNOSIS — R73.01 IMPAIRED FASTING GLUCOSE: ICD-10-CM

## 2023-10-17 DIAGNOSIS — M25.579 ARTHRALGIA OF TOE, UNSPECIFIED LATERALITY: ICD-10-CM

## 2023-10-17 DIAGNOSIS — Z00.00 PREVENTATIVE HEALTH CARE: Primary | ICD-10-CM

## 2023-10-17 DIAGNOSIS — Z87.442 HISTORY OF KIDNEY STONES: ICD-10-CM

## 2023-10-17 DIAGNOSIS — M19.90 INFLAMMATORY ARTHRITIS: ICD-10-CM

## 2023-10-17 DIAGNOSIS — E78.2 MIXED HYPERLIPIDEMIA: ICD-10-CM

## 2023-10-17 DIAGNOSIS — S39.012S STRAIN OF LUMBAR REGION, SEQUELA: ICD-10-CM

## 2023-10-17 LAB
25(OH)D3 SERPL-MCNC: 20.8 NG/ML (ref 30–100)
ALBUMIN SERPL-MCNC: 4.6 G/DL (ref 3.5–5.2)
ALBUMIN UR-MCNC: 2.1 MG/DL
ALBUMIN/GLOB SERPL: 1.4 G/DL
ALP SERPL-CCNC: 102 U/L (ref 39–117)
ALT SERPL W P-5'-P-CCNC: 36 U/L (ref 1–41)
ANION GAP SERPL CALCULATED.3IONS-SCNC: 8.2 MMOL/L (ref 5–15)
AST SERPL-CCNC: 41 U/L (ref 1–40)
BACTERIA UR QL AUTO: NORMAL /HPF
BASOPHILS # BLD AUTO: 0.04 10*3/MM3 (ref 0–0.2)
BASOPHILS NFR BLD AUTO: 0.8 % (ref 0–1.5)
BILIRUB SERPL-MCNC: 0.7 MG/DL (ref 0–1.2)
BILIRUB UR QL STRIP: NEGATIVE
BUN SERPL-MCNC: 10 MG/DL (ref 6–20)
BUN/CREAT SERPL: 10.4 (ref 7–25)
CALCIUM SPEC-SCNC: 10 MG/DL (ref 8.6–10.5)
CHLORIDE SERPL-SCNC: 105 MMOL/L (ref 98–107)
CHOLEST SERPL-MCNC: 129 MG/DL (ref 0–200)
CLARITY UR: CLEAR
CO2 SERPL-SCNC: 25.8 MMOL/L (ref 22–29)
COLOR UR: YELLOW
CREAT SERPL-MCNC: 0.96 MG/DL (ref 0.76–1.27)
CREAT UR-MCNC: 267 MG/DL
CRP SERPL-MCNC: <0.3 MG/DL (ref 0–0.5)
DEPRECATED RDW RBC AUTO: 38.8 FL (ref 37–54)
EGFRCR SERPLBLD CKD-EPI 2021: 100 ML/MIN/1.73
EOSINOPHIL # BLD AUTO: 0.27 10*3/MM3 (ref 0–0.4)
EOSINOPHIL NFR BLD AUTO: 5.2 % (ref 0.3–6.2)
ERYTHROCYTE [DISTWIDTH] IN BLOOD BY AUTOMATED COUNT: 12.2 % (ref 12.3–15.4)
ERYTHROCYTE [SEDIMENTATION RATE] IN BLOOD: 26 MM/HR (ref 0–15)
GLOBULIN UR ELPH-MCNC: 3.4 GM/DL
GLUCOSE SERPL-MCNC: 97 MG/DL (ref 65–99)
GLUCOSE UR STRIP-MCNC: NEGATIVE MG/DL
HBA1C MFR BLD: 5.6 % (ref 4.8–5.6)
HCT VFR BLD AUTO: 44.9 % (ref 37.5–51)
HDLC SERPL-MCNC: 26 MG/DL (ref 40–60)
HGB BLD-MCNC: 15.4 G/DL (ref 13–17.7)
HGB UR QL STRIP.AUTO: ABNORMAL
HOLD SPECIMEN: NORMAL
HYALINE CASTS UR QL AUTO: NORMAL /LPF
IMM GRANULOCYTES # BLD AUTO: 0.02 10*3/MM3 (ref 0–0.05)
IMM GRANULOCYTES NFR BLD AUTO: 0.4 % (ref 0–0.5)
KETONES UR QL STRIP: NEGATIVE
LDLC SERPL CALC-MCNC: 85 MG/DL (ref 0–100)
LDLC/HDLC SERPL: 3.26 {RATIO}
LEUKOCYTE ESTERASE UR QL STRIP.AUTO: NEGATIVE
LYMPHOCYTES # BLD AUTO: 2.01 10*3/MM3 (ref 0.7–3.1)
LYMPHOCYTES NFR BLD AUTO: 39 % (ref 19.6–45.3)
MCH RBC QN AUTO: 30 PG (ref 26.6–33)
MCHC RBC AUTO-ENTMCNC: 34.3 G/DL (ref 31.5–35.7)
MCV RBC AUTO: 87.5 FL (ref 79–97)
MICROALBUMIN/CREAT UR: 7.9 MG/G (ref 0–29)
MONOCYTES # BLD AUTO: 0.6 10*3/MM3 (ref 0.1–0.9)
MONOCYTES NFR BLD AUTO: 11.6 % (ref 5–12)
NEUTROPHILS NFR BLD AUTO: 2.22 10*3/MM3 (ref 1.7–7)
NEUTROPHILS NFR BLD AUTO: 43 % (ref 42.7–76)
NITRITE UR QL STRIP: NEGATIVE
NRBC BLD AUTO-RTO: 0 /100 WBC (ref 0–0.2)
PH UR STRIP.AUTO: 5.5 [PH] (ref 5–8)
PLATELET # BLD AUTO: 350 10*3/MM3 (ref 140–450)
PMV BLD AUTO: 9.7 FL (ref 6–12)
POTASSIUM SERPL-SCNC: 4.3 MMOL/L (ref 3.5–5.2)
PROT SERPL-MCNC: 8 G/DL (ref 6–8.5)
PROT UR QL STRIP: ABNORMAL
RBC # BLD AUTO: 5.13 10*6/MM3 (ref 4.14–5.8)
RBC # UR STRIP: NORMAL /HPF
REF LAB TEST METHOD: NORMAL
SODIUM SERPL-SCNC: 139 MMOL/L (ref 136–145)
SP GR UR STRIP: 1.03 (ref 1–1.03)
SQUAMOUS #/AREA URNS HPF: NORMAL /HPF
TRIGL SERPL-MCNC: 91 MG/DL (ref 0–150)
TSH SERPL DL<=0.05 MIU/L-ACNC: 1.39 UIU/ML (ref 0.27–4.2)
URATE SERPL-MCNC: 6.4 MG/DL (ref 3.4–7)
UROBILINOGEN UR QL STRIP: ABNORMAL
VLDLC SERPL-MCNC: 18 MG/DL (ref 5–40)
WBC # UR STRIP: NORMAL /HPF
WBC NRBC COR # BLD: 5.16 10*3/MM3 (ref 3.4–10.8)

## 2023-10-17 PROCEDURE — 84550 ASSAY OF BLOOD/URIC ACID: CPT

## 2023-10-17 PROCEDURE — 80061 LIPID PANEL: CPT

## 2023-10-17 PROCEDURE — 82043 UR ALBUMIN QUANTITATIVE: CPT

## 2023-10-17 PROCEDURE — 82570 ASSAY OF URINE CREATININE: CPT

## 2023-10-17 PROCEDURE — 86140 C-REACTIVE PROTEIN: CPT

## 2023-10-17 PROCEDURE — 80050 GENERAL HEALTH PANEL: CPT

## 2023-10-17 PROCEDURE — 83036 HEMOGLOBIN GLYCOSYLATED A1C: CPT

## 2023-10-17 PROCEDURE — 85652 RBC SED RATE AUTOMATED: CPT

## 2023-10-17 PROCEDURE — 81001 URINALYSIS AUTO W/SCOPE: CPT

## 2023-10-17 PROCEDURE — 82306 VITAMIN D 25 HYDROXY: CPT

## 2023-10-17 NOTE — ASSESSMENT & PLAN NOTE
Chronic low back strain with previous x-rays of the lumbar spine the patient does have a very minor curve to the left with some reduced range of motion on leftward leaning normal with right word leaning encourage stretching exercises before excessive exercise and suggesting Advil as needed and physical therapy at [facility] physical therapy under Erin's guidance

## 2023-10-17 NOTE — PROGRESS NOTES
[facility]     Reymundo Celeste  1980   [id number]      Patient Care Team:  Max Tadeo MD as PCP - General (Internal Medicine)  Bennett Glass MD as Consulting Physician (Rheumatology)    Chief Complaint::   Chief Complaint   Patient presents with    Annual Exam     Labs drawn this morning.  Had stopped Crestor but resumed 15 days ago          HPI  The patient is a 43-year-old male with a history of mixed hyperlipidemia. He presents for a preventative exam.    The patient is doing well overall. He has been taking Crestor for at least 6 months. He began taking Crestor 15 days ago. He began taking his vitamin D again. He has been performing stretches for the last 3 months. He complains of a nagging pain in his back. While sitting watching a movie for 1 hour or 2, he will experience pain with certain movements. If he sits for 8 hours during a workday, he will begin experiencing stiffness in the evening. He used to walk in the morning and evening. He denies walking within the last 8 months but has returned for the past 3 months.     The patient denies any headache, dizziness, vision pain, or changes. He denies any allergy, congestion, or drainage. He denies any ear trouble. He denies any trouble with his throat. He denies any dry throat or dysphagia. He denies any issues with coughing, wheezing, or shortness of breath. He denies any heart pain. He denies any nausea or vomiting. He denies any burning or stinging when he urinates. He does not have to get up at night to empty his bladder. He denies any difficulty with his hips, knees, or ankles.    The patient has had 3 COVID-19 vaccines. He denies being around a lot of people. He works from home with his wife and child.       Patient Active Problem List   Diagnosis    Inflammatory arthritis    History of meniscal tear    History of kidney stones    Mixed hyperlipidemia    Vitamin D deficiency    Numerous skin moles    Low back strain     "Preventative health care    Preventative health care    Asymptomatic microscopic hematuria        Past Medical History:   Diagnosis Date    History of tear of meniscus of knee joint     injection    Hyperlipidemia     Inflammatory arthritis     Kidney stone     Vitamin D deficiency        History reviewed. No pertinent surgical history.    Family History   Problem Relation Age of Onset    Cancer Mother         pancreas    Diabetes Mother     Hypertension Mother     Hypertension Father        Social History     Socioeconomic History    Marital status:    Tobacco Use    Smoking status: Never    Smokeless tobacco: Never   Substance and Sexual Activity    Alcohol use: No    Drug use: No    Sexual activity: Defer       No Known Allergies    Immunization History   Administered Date(s) Administered    COVID-19 (PFIZER) Purple Cap Monovalent 01/29/2021, 02/24/2021        Health Maintenance Due   Topic Date Due    TDAP/TD VACCINES (1 - Tdap) Never done    HEPATITIS C SCREENING  Never done    ANNUAL PHYSICAL  03/22/2022    INFLUENZA VACCINE  08/01/2023    COVID-19 Vaccine (3 - 2023-24 season) 09/01/2023        Review of Systems   HEENT: Denies any hearing changes, eyesight changes, no headache or dizziness  NECK: Denies any pain, stiffness, swelling or dysphagia  CHEST: Denies cough or wheeze or recent lung infections  CARDIAC: Denies chest pain, pressure or palpitations  ABD: Denies nausea, vomiting or abdominal pain  : Denies dysuria  NEURO: Denies syncope, concussion, neuropathy  PSYCH: Denies any stress  EXTREM: Complains of back pain  SKIN: Denies any rash    Vital Signs  Vitals:    10/17/23 1455   BP: 106/80   BP Location: Left arm   Patient Position: Sitting   Cuff Size: Adult   Pulse: 72   Weight: 81.6 kg (180 lb)   Height: 182.9 cm (72\")   PainSc: 0-No pain     Body mass index is 24.41 kg/m².  BMI is within normal parameters. No other follow-up for BMI required.     Advance Care Planning         Current " Outpatient Medications:     rosuvastatin (CRESTOR) 20 MG tablet, Take 1 tablet by mouth Daily., Disp: 30 tablet, Rfl: 0    Physical Exam   HEENT: Pupils equal reactive ENT clear, no facial asymmetry, the pharynx is clear  NECK: No masses bruit or thyromegaly  CHEST: Clear without rales wheezes or rhonchi  CARDIAC: Regular rhythm without gallop rub or click, blood pressure is 122/70 mmHg, heart rate stable  ABD: Liver spleen normal, good bowel sounds, nontender  : Deferred  NEURO: Intact  PSYCH: Normal  EXTREM: No significant arthritic changes, no edema some minor low back reduced range of motion to the left  SKIN: Clear     Results Review:      Procedures recent labs pending drawn today    Medication Review: Medications reviewed and noted    Patient wellness counseling  Exercise: Encouraged exercise and stretching and reevaluation at physical therapy  Diet: Healthy cardiac diet  Screening: Recent lab work and screening  Social History     Socioeconomic History    Marital status:    Tobacco Use    Smoking status: Never    Smokeless tobacco: Never   Substance and Sexual Activity    Alcohol use: No    Drug use: No    Sexual activity: Defer        Assessment/Plan:  Problem List Items Addressed This Visit          Cardiac and Vasculature    Mixed hyperlipidemia    Overview     History of mixed hyperlipidemia on Crestor 20 mg daily denies myalgia arthralgia         Current Assessment & Plan       Mixed hyperlipidemia with the patient not on his Crestor statin therapy until approximately 2 weeks ago when he resumed, with fasting cholesterol this day of 129 triglycerides 91 LDL 85, the patient is encouraged to continue his Crestor therapy with no lapses in therapy time.         Relevant Medications    rosuvastatin (CRESTOR) 20 MG tablet       Genitourinary and Reproductive     Asymptomatic microscopic hematuria    Overview     The patient was fasting and somewhat dehydrated this day and had a urine analysis that  did have RBCs  The patient is asymptomatic without dysuria discomfort and there was no bacteria we will repeat urine analysis when the patient is well-hydrated if for microscopic examination for RBCs         Current Assessment & Plan     We will repeat microscopic urine analysis for RBCs         Relevant Orders    Urinalysis With Microscopic - Urine, Clean Catch       Health Encounters    Preventative health care - Primary    Overview     43-year-old male presents for annual preventative visit and physical examination on October 17, 2023            Musculoskeletal and Injuries    Inflammatory arthritis    Overview     History of inflammatory arthritis elevated by Dr. Glass had [facility] currently complaining of metatarsal head discomfort left foot middle toe right foot second toe Celebrex 100 twice daily has been effective he currently is not on that medicine he is followed by rheumatology.         Current Assessment & Plan     The patient states flares of arthritic soreness with discomfort in the metatarsal heads of left foot and right foot has been given Celebrex he also complains of some low back discomfort that seems to be exacerbated by long periods of sitting or rest in bed with some stiffness like discomfort in the low back without significant radiation.  The patient previously has been on NSAID therapy of Celebrex and currently takes Tylenol or Advil ibuprofen as needed recent lab work revealed a sed rate of 26 normal 0-15 normal CRP level with SGOT elevated by one-point at 41 and microscopic red cells in the urine suggest continued follow-up with Dr. Rivera and suggest physical therapy for instructions on exercises for low back pain he has seen [facility] in the recent past with Erin he will get in touch with her         Low back strain    Overview     Patient states he was reaching down to obtain an object on floor and felt his back with severe pain and discomfort on Friday, 12  February the pain was similar to a low back injury approximately 3 years ago the pain is primarily mid back with some left-sided weakness and radiation when he is sitting he feels its acute strain, he called his office and was given Flexeril twice daily and Advil twice daily on February 12 the patient has been receiving physical therapy at [facility] physical [facility] on February 22 in February 19 with follow-up in 2 days he states his back is slightly improved on the Flexeril and Advil but occasionally has exacerbations of discomfort and pain.  Suggest continue therapy with physical therapy and will add Medrol 4 mg twice daily for 2 days 2 mg twice daily for 2 days and 2 mg daily for 2 days, and related hydrocodone 5/325 number fifteen 1 tablet daily 12 to 8 hours as needed         Current Assessment & Plan     Chronic low back strain with previous x-rays of the lumbar spine the patient does have a very minor curve to the left with some reduced range of motion on leftward leaning normal with right word leaning encourage stretching exercises before excessive exercise and suggesting Advil as needed and physical therapy at [facility] physical therapy under Erin's guidance             Patient Instructions   Recent lab discussed and copy given with sed rate of 26, SGOT of 41 HDL 26 microscopic hematuria noted  Encouraged physical therapy with [facility] with Erin for lumbar spine strain  Encouraged continued healthy cardiac diet encouraged ADL activity and stretching exercises before any variety excess activity  Continue current medications of Crestor 20 mg  Repeat urinalysis microscopic for hematuria  Encouraged follow-up with Dr. Rivera  Return visit in 6 months or as needed       CMP:  Lab Results   Component Value Date    BUN 10 10/17/2023    CREATININE 0.96 10/17/2023    EGFRIFNONA 85 03/22/2021    EGFRIFAFRI 103 03/22/2021    BCR 10.4 10/17/2023     10/17/2023    K 4.3 10/17/2023    CO2 25.8 10/17/2023    CALCIUM 10.0  10/17/2023    ALBUMIN 4.6 10/17/2023    BILITOT 0.7 10/17/2023    ALKPHOS 102 10/17/2023    AST 41 (H) 10/17/2023    ALT 36 10/17/2023     HbA1c:  Lab Results   Component Value Date    HGBA1C 5.60 10/17/2023     Microalbumin:  Lab Results   Component Value Date    MICROALBUR 2.1 10/17/2023     Lipid Panel  Lab Results   Component Value Date    CHOL 129 10/17/2023    TRIG 91 10/17/2023    HDL 26 (L) 10/17/2023    LDL 85 10/17/2023    AST 41 (H) 10/17/2023    ALT 36 10/17/2023        Counseling was given to patient for the following topics: disease prevention.    Plan of care reviewed with patient at the conclusion of today's visit. Education was provided regarding diagnosis, management, and any prescribed or recommended OTC medications.Patient verbalizes understanding of and agreement with management plan.         Max Tadeo MD    Note: Part of this note may be an electronic transcription/translation of spoken language to printed text using Dragon Dictation System.      Transcribed from ambient dictation for Max Tadeo MD by Agata Jeff.  10/17/23   16:44 EDT    Patient or patient representative verbalized consent to the visit recording.  I have personally performed the services described in this document as transcribed by the above individual, and it is both accurate and complete.

## 2023-10-17 NOTE — PATIENT INSTRUCTIONS
Recent lab discussed and copy given with sed rate of 26, SGOT of 41 HDL 26 microscopic hematuria noted  Encouraged physical therapy with [facility] with Erin for lumbar spine strain  Encouraged continued healthy cardiac diet encouraged ADL activity and stretching exercises before any variety excess activity  Continue current medications of Crestor 20 mg  Repeat urinalysis microscopic for hematuria  Encouraged follow-up with Dr. Rivera  Return visit in 6 months or as needed

## 2023-10-17 NOTE — ASSESSMENT & PLAN NOTE
The patient states flares of arthritic soreness with discomfort in the metatarsal heads of left foot and right foot has been given Celebrex he also complains of some low back discomfort that seems to be exacerbated by long periods of sitting or rest in bed with some stiffness like discomfort in the low back without significant radiation.  The patient previously has been on NSAID therapy of Celebrex and currently takes Tylenol or Advil ibuprofen as needed recent lab work revealed a sed rate of 26 normal 0-15 normal CRP level with SGOT elevated by one-point at 41 and microscopic red cells in the urine suggest continued follow-up with Dr. Rivera and suggest physical therapy for instructions on exercises for low back pain he has seen [facility] in the recent past with Erin he will get in touch with her

## 2023-10-17 NOTE — ASSESSMENT & PLAN NOTE
Mixed hyperlipidemia with the patient not on his Crestor statin therapy until approximately 2 weeks ago when he resumed, with fasting cholesterol this day of 129 triglycerides 91 LDL 85, the patient is encouraged to continue his Crestor therapy with no lapses in therapy time.

## 2023-11-09 ENCOUNTER — TRANSCRIBE ORDERS (OUTPATIENT)
Dept: ADMINISTRATIVE | Facility: HOSPITAL | Age: 43
End: 2023-11-09
Payer: COMMERCIAL

## 2023-11-09 ENCOUNTER — HOSPITAL ENCOUNTER (OUTPATIENT)
Dept: GENERAL RADIOLOGY | Facility: HOSPITAL | Age: 43
Discharge: HOME | End: 2023-11-09
Admitting: INTERNAL MEDICINE
Payer: COMMERCIAL

## 2023-11-09 DIAGNOSIS — M54.51 VERTEBROGENIC LOW BACK PAIN: Primary | ICD-10-CM

## 2023-11-09 PROCEDURE — 72100 X-RAY EXAM L-S SPINE 2/3 VWS: CPT

## 2023-11-09 RX ORDER — ROSUVASTATIN CALCIUM 20 MG/1
20 TABLET, COATED ORAL DAILY
Qty: 90 TABLET | Refills: 0 | Status: SHIPPED | OUTPATIENT
Start: 2023-11-09

## 2024-04-30 RX ORDER — ROSUVASTATIN CALCIUM 20 MG/1
20 TABLET, COATED ORAL DAILY
Qty: 90 TABLET | Refills: 0 | Status: SHIPPED | OUTPATIENT
Start: 2024-04-30

## 2024-05-01 ENCOUNTER — TRANSCRIBE ORDERS (OUTPATIENT)
Dept: ADMINISTRATIVE | Facility: HOSPITAL | Age: 44
End: 2024-05-01
Payer: COMMERCIAL

## 2024-05-01 DIAGNOSIS — M54.51 VERTEBROGENIC LOW BACK PAIN: Primary | ICD-10-CM

## 2024-05-23 ENCOUNTER — HOSPITAL ENCOUNTER (OUTPATIENT)
Dept: MRI IMAGING | Facility: HOSPITAL | Age: 44
Discharge: HOME | End: 2024-05-23
Admitting: INTERNAL MEDICINE
Payer: COMMERCIAL

## 2024-05-23 DIAGNOSIS — M54.51 VERTEBROGENIC LOW BACK PAIN: ICD-10-CM

## 2024-05-23 PROCEDURE — 0 GADOBENATE DIMEGLUMINE 529 MG/ML SOLUTION: Performed by: INTERNAL MEDICINE

## 2024-05-23 PROCEDURE — A9577 INJ MULTIHANCE: HCPCS | Performed by: INTERNAL MEDICINE

## 2024-05-23 PROCEDURE — 72158 MRI LUMBAR SPINE W/O & W/DYE: CPT

## 2024-05-23 RX ADMIN — GADOBENATE DIMEGLUMINE 17 ML: 529 INJECTION, SOLUTION INTRAVENOUS at 08:42

## 2024-10-03 RX ORDER — ROSUVASTATIN CALCIUM 20 MG/1
20 TABLET, COATED ORAL DAILY
Qty: 90 TABLET | Refills: 0 | Status: SHIPPED | OUTPATIENT
Start: 2024-10-03

## 2025-02-20 RX ORDER — ROSUVASTATIN CALCIUM 20 MG/1
20 TABLET, COATED ORAL DAILY
Qty: 30 TABLET | Refills: 0 | Status: SHIPPED | OUTPATIENT
Start: 2025-02-20

## 2025-02-20 NOTE — TELEPHONE ENCOUNTER
Rx Refill Note  Requested Prescriptions     Pending Prescriptions Disp Refills    rosuvastatin (CRESTOR) 20 MG tablet 90 tablet 0     Sig: Take 1 tablet by mouth Daily.      Last office visit with prescribing clinician: 10/17/2023   Last telemedicine visit with prescribing clinician: Visit date not found   Next office visit with prescribing clinician: Visit date not found                         Would you like a call back once the refill request has been completed: [] Yes [] No    If the office needs to give you a call back, can they leave a voicemail: [] Yes [] No    Luz Jack LPN  02/20/25, 08:04 EST

## 2025-04-29 ENCOUNTER — TELEPHONE (OUTPATIENT)
Dept: INTERNAL MEDICINE | Facility: CLINIC | Age: 45
End: 2025-04-29
Payer: COMMERCIAL

## 2025-04-30 RX ORDER — ROSUVASTATIN CALCIUM 20 MG/1
20 TABLET, COATED ORAL DAILY
Qty: 30 TABLET | Refills: 0 | OUTPATIENT
Start: 2025-04-30

## 2025-04-30 NOTE — TELEPHONE ENCOUNTER
LVM FOR THE PT TO RETURN CALL TO SCHEDULE AN APPT FOR THE MEDICATION REQUESTED.    HUB TO READ:    PT WILL NEED TO SCHEDULE AN APPT TO RECEIVES REFILLS.  PLEASE SCHEDULE.

## 2025-04-30 NOTE — TELEPHONE ENCOUNTER
Rx Refill Note  Requested Prescriptions     Pending Prescriptions Disp Refills    rosuvastatin (CRESTOR) 20 MG tablet 30 tablet 0     Sig: Take 1 tablet by mouth Daily.      Last office visit with prescribing clinician: 10/17/2023   Last telemedicine visit with prescribing clinician: Visit date not found   Next office visit with prescribing clinician: Visit date not found                         Would you like a call back once the refill request has been completed: [] Yes [] No    If the office needs to give you a call back, can they leave a voicemail: [] Yes [] No    Hope Celis MA  04/30/25, 08:58 EDT

## 2025-05-01 ENCOUNTER — TELEPHONE (OUTPATIENT)
Dept: INTERNAL MEDICINE | Facility: CLINIC | Age: 45
End: 2025-05-01
Payer: COMMERCIAL

## 2025-05-01 DIAGNOSIS — E78.2 MIXED HYPERLIPIDEMIA: Primary | ICD-10-CM

## 2025-05-01 DIAGNOSIS — E55.9 VITAMIN D DEFICIENCY: ICD-10-CM

## 2025-05-01 DIAGNOSIS — Z00.00 PREVENTATIVE HEALTH CARE: ICD-10-CM

## 2025-05-01 NOTE — TELEPHONE ENCOUNTER
I HAVE SPOKEN WITH THE PT.  I HAVE HIM SCHEDULED FOR A PHYSICAL ON 05/05/25 AT 12:45 PM.  PT WOULD LIKE HIS LAB ORDERS ENTERED SO HE CAN GET THEM COMPLETED BEFORE THE APPT.

## 2025-05-01 NOTE — TELEPHONE ENCOUNTER
Fasting labs are ordered and can be done anytime be sure to hydrate with water prior to the lab draw please inform

## 2025-05-02 ENCOUNTER — LAB (OUTPATIENT)
Dept: LAB | Facility: HOSPITAL | Age: 45
End: 2025-05-02
Payer: COMMERCIAL

## 2025-05-02 DIAGNOSIS — E78.2 MIXED HYPERLIPIDEMIA: ICD-10-CM

## 2025-05-02 DIAGNOSIS — Z00.00 PREVENTATIVE HEALTH CARE: ICD-10-CM

## 2025-05-02 DIAGNOSIS — E55.9 VITAMIN D DEFICIENCY: ICD-10-CM

## 2025-05-02 LAB
25(OH)D3 SERPL-MCNC: 25.4 NG/ML (ref 30–100)
ALBUMIN SERPL-MCNC: 4.4 G/DL (ref 3.5–5.2)
ALBUMIN/GLOB SERPL: 1.4 G/DL
ALP SERPL-CCNC: 96 U/L (ref 39–117)
ALT SERPL W P-5'-P-CCNC: 22 U/L (ref 1–41)
ANION GAP SERPL CALCULATED.3IONS-SCNC: 10.9 MMOL/L (ref 5–15)
AST SERPL-CCNC: 19 U/L (ref 1–40)
BASOPHILS # BLD AUTO: 0.04 10*3/MM3 (ref 0–0.2)
BASOPHILS NFR BLD AUTO: 0.9 % (ref 0–1.5)
BILIRUB SERPL-MCNC: 0.4 MG/DL (ref 0–1.2)
BUN SERPL-MCNC: 10 MG/DL (ref 6–20)
BUN/CREAT SERPL: 10.4 (ref 7–25)
CALCIUM SPEC-SCNC: 9.8 MG/DL (ref 8.6–10.5)
CHLORIDE SERPL-SCNC: 102 MMOL/L (ref 98–107)
CHOLEST SERPL-MCNC: 209 MG/DL (ref 0–200)
CO2 SERPL-SCNC: 25.1 MMOL/L (ref 22–29)
CREAT SERPL-MCNC: 0.96 MG/DL (ref 0.76–1.27)
DEPRECATED RDW RBC AUTO: 41.2 FL (ref 37–54)
EGFRCR SERPLBLD CKD-EPI 2021: 99.3 ML/MIN/1.73
EOSINOPHIL # BLD AUTO: 0.34 10*3/MM3 (ref 0–0.4)
EOSINOPHIL NFR BLD AUTO: 7.6 % (ref 0.3–6.2)
ERYTHROCYTE [DISTWIDTH] IN BLOOD BY AUTOMATED COUNT: 12.3 % (ref 12.3–15.4)
GLOBULIN UR ELPH-MCNC: 3.2 GM/DL
GLUCOSE SERPL-MCNC: 89 MG/DL (ref 65–99)
HCT VFR BLD AUTO: 46.4 % (ref 37.5–51)
HDLC SERPL-MCNC: 27 MG/DL (ref 40–60)
HGB BLD-MCNC: 14.9 G/DL (ref 13–17.7)
IMM GRANULOCYTES # BLD AUTO: 0.02 10*3/MM3 (ref 0–0.05)
IMM GRANULOCYTES NFR BLD AUTO: 0.4 % (ref 0–0.5)
LDLC SERPL CALC-MCNC: 164 MG/DL (ref 0–100)
LDLC/HDLC SERPL: 6.01 {RATIO}
LYMPHOCYTES # BLD AUTO: 1.91 10*3/MM3 (ref 0.7–3.1)
LYMPHOCYTES NFR BLD AUTO: 42.8 % (ref 19.6–45.3)
MCH RBC QN AUTO: 29.1 PG (ref 26.6–33)
MCHC RBC AUTO-ENTMCNC: 32.1 G/DL (ref 31.5–35.7)
MCV RBC AUTO: 90.6 FL (ref 79–97)
MONOCYTES # BLD AUTO: 0.51 10*3/MM3 (ref 0.1–0.9)
MONOCYTES NFR BLD AUTO: 11.4 % (ref 5–12)
NEUTROPHILS NFR BLD AUTO: 1.64 10*3/MM3 (ref 1.7–7)
NEUTROPHILS NFR BLD AUTO: 36.9 % (ref 42.7–76)
NRBC BLD AUTO-RTO: 0 /100 WBC (ref 0–0.2)
PLATELET # BLD AUTO: 308 10*3/MM3 (ref 140–450)
PMV BLD AUTO: 9.3 FL (ref 6–12)
POTASSIUM SERPL-SCNC: 4 MMOL/L (ref 3.5–5.2)
PROT SERPL-MCNC: 7.6 G/DL (ref 6–8.5)
RBC # BLD AUTO: 5.12 10*6/MM3 (ref 4.14–5.8)
SODIUM SERPL-SCNC: 138 MMOL/L (ref 136–145)
TRIGL SERPL-MCNC: 98 MG/DL (ref 0–150)
TSH SERPL DL<=0.05 MIU/L-ACNC: 2.06 UIU/ML (ref 0.27–4.2)
VLDLC SERPL-MCNC: 18 MG/DL (ref 5–40)
WBC NRBC COR # BLD AUTO: 4.46 10*3/MM3 (ref 3.4–10.8)

## 2025-05-02 PROCEDURE — 82306 VITAMIN D 25 HYDROXY: CPT

## 2025-05-02 PROCEDURE — 84443 ASSAY THYROID STIM HORMONE: CPT

## 2025-05-02 PROCEDURE — 85025 COMPLETE CBC W/AUTO DIFF WBC: CPT

## 2025-05-02 PROCEDURE — 80061 LIPID PANEL: CPT

## 2025-05-02 PROCEDURE — 80053 COMPREHEN METABOLIC PANEL: CPT

## 2025-05-04 PROBLEM — Z00.00 PREVENTATIVE HEALTH CARE: Status: ACTIVE | Noted: 2025-05-04

## 2025-05-05 ENCOUNTER — OFFICE VISIT (OUTPATIENT)
Dept: INTERNAL MEDICINE | Facility: CLINIC | Age: 45
End: 2025-05-05
Payer: COMMERCIAL

## 2025-05-05 VITALS
OXYGEN SATURATION: 98 % | BODY MASS INDEX: 24.43 KG/M2 | TEMPERATURE: 98.2 F | HEART RATE: 86 BPM | DIASTOLIC BLOOD PRESSURE: 80 MMHG | SYSTOLIC BLOOD PRESSURE: 122 MMHG | HEIGHT: 72 IN | WEIGHT: 180.4 LBS

## 2025-05-05 DIAGNOSIS — Z00.00 PREVENTATIVE HEALTH CARE: Primary | ICD-10-CM

## 2025-05-05 DIAGNOSIS — S39.012S STRAIN OF LUMBAR REGION, SEQUELA: ICD-10-CM

## 2025-05-05 DIAGNOSIS — M19.90 INFLAMMATORY ARTHRITIS: ICD-10-CM

## 2025-05-05 DIAGNOSIS — E78.2 MIXED HYPERLIPIDEMIA: ICD-10-CM

## 2025-05-05 DIAGNOSIS — E55.9 VITAMIN D DEFICIENCY: ICD-10-CM

## 2025-05-05 RX ORDER — ERGOCALCIFEROL 1.25 MG/1
50000 CAPSULE, LIQUID FILLED ORAL WEEKLY
Qty: 5 CAPSULE | Refills: 0 | Status: SHIPPED | OUTPATIENT
Start: 2025-05-05

## 2025-05-05 RX ORDER — ROSUVASTATIN CALCIUM 20 MG/1
20 TABLET, COATED ORAL DAILY
Qty: 90 TABLET | Refills: 3 | Status: SHIPPED | OUTPATIENT
Start: 2025-05-05

## 2025-05-05 NOTE — ASSESSMENT & PLAN NOTE
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

## 2025-05-05 NOTE — PROGRESS NOTES
[facility]     Reymundo Celeste  1980   [id number]      Patient Care Team:  Max Tadeo MD as PCP - General (Internal Medicine)  Bennett Glass MD as Consulting Physician (Rheumatology)    Chief Complaint::   Chief Complaint   Patient presents with    Annual Exam    Hyperlipidemia            HPI  History of Present Illness  The patient is a 44-year-old male who presents for an annual preventive visit with a history of lipidemia, inflammatory arthritis, and vitamin D deficiency.    He reports overall good health but has been inconsistent with his cholesterol medication due to a 30-day prescription limit. He has not taken his cholesterol medication for the past 10 days and has requested a 90-day supply to ensure continuous use. His diet includes olive oil, avocado, and vegetables, with minimal lunch intake and early dinner. He admits to dietary lapses in January and February 2025.    He continues to experience back pain, which he attributes to weight gain of 5 to 10 pounds. His physical activity is limited, although he attempts to maintain a walking routine. He does not recall any specific back injury but mentions a severe back spasm 6 to 8 years ago that required emergency treatment in the . Despite muscle relaxants and pain medications, he was advised to rest for several days. His current back pain is intermittent and managed with Motrin and morning exercises. He avoids activities that may exacerbate his back pain, including yard work and cricket. He describes his back pain as similar to sciatica, radiating down his left leg, particularly affecting his thigh muscle. The pain, accompanied by numbness, typically lasts for 10 to 15 days before subsiding.    He is currently on a daily regimen of vitamin D 2000 IU. His recent lab results showed a vitamin D level of 25, which is below the normal range of . His cholesterol was elevated at 209, with an LDL of 164 and HDL being low.  Previous lab results from 10/2024 showed better cholesterol levels with an LDL of 85.    He reports no headaches, dizziness, lightheadedness, allergies, congestion, vision changes, hearing loss, sore throat, gum bleeding or irritation, dysphagia, choking, coughing, wheezing, recent lung infections, chest pain, palpitations, gastrointestinal symptoms, bladder issues, nocturia, hernias, testicular swelling or soreness. He has not undergone a colonoscopy and reports no family history of colon cancer or polyps. He also reports no hip, knee, or ankle issues. He does not have asthma.    SOCIAL HISTORY  He does not smoke. He works from home.    FAMILY HISTORY  His mother had pancreatic cancer.         Patient Active Problem List   Diagnosis    Inflammatory arthritis    History of meniscal tear    History of kidney stones    Mixed hyperlipidemia    Vitamin D deficiency    Numerous skin moles    Low back strain    Preventative health care    Preventative health care    Asymptomatic microscopic hematuria    Preventative health care        Past Medical History:   Diagnosis Date    History of tear of meniscus of knee joint     injection    Hyperlipidemia     Inflammatory arthritis     Kidney stone     Vitamin D deficiency        History reviewed. No pertinent surgical history.    Family History   Problem Relation Age of Onset    Cancer Mother         pancreas    Diabetes Mother     Hypertension Mother     Hypertension Father        Social History     Socioeconomic History    Marital status:    Tobacco Use    Smoking status: Never    Smokeless tobacco: Never   Vaping Use    Vaping status: Some Days   Substance and Sexual Activity    Alcohol use: Never    Drug use: Never    Sexual activity: Yes     Partners: Female     Birth control/protection: Condom       No Known Allergies    Immunization History   Administered Date(s) Administered    COVID-19 (PFIZER) Purple Cap Monovalent 01/29/2021, 02/24/2021        [facility]  "Maintenance Due   Topic Date Due    TDAP/TD VACCINES (1 - Tdap) Never done    HEPATITIS C SCREENING  Never done    COLORECTAL CANCER SCREENING  Never done    COVID-19 Vaccine (3 - 2024-25 season) 09/01/2024    ANNUAL PHYSICAL  10/17/2024        Review of Systems   Constitutional: Negative.    HENT: Negative.     Eyes: Negative.    Respiratory: Negative.     Cardiovascular: Negative.    Gastrointestinal: Negative.    Endocrine: Negative.    Genitourinary: Negative.    Musculoskeletal:  Positive for back pain.   Skin: Negative.    Allergic/Immunologic: Negative.    Neurological: Negative.    Hematological: Negative.    Psychiatric/Behavioral: Negative.        Physical Exam  Head: Normocephalic, atraumatic no asymmetry sinuses nontender  Ears: External ear canals and tympanic membranes intact  Eyes: Pupils equal and round, conjunctivae clear  Nose: Septum midline, nares patent, mucosa normal  Mouth/Throat: Mucous membranes moist, no erythema, no exudate  Neck: Supple, no abnormalities no mass bruit or thyromegaly  Respiratory: Clear to auscultation, no wheezing, rales or rhonchi  Cardiovascular: Regular rate and rhythm, no murmurs, rubs, or gallops blood pressure normal  Gastrointestinal: Soft, no tenderness, no distention, no masses liver spleen normal no bruit  Extremities: No edema, no cyanosis  Musculoskeletal: No joint or muscular abnormalities noted reduced range of motion of the lumbar spine  Skin: No abnormalities, no rashes or lesions           Vital Signs  Vitals:    05/05/25 1239   BP: 122/80   BP Location: Left arm   Patient Position: Sitting   Cuff Size: Adult   Pulse: 86   Temp: 98.2 °F (36.8 °C)   TempSrc: Infrared   SpO2: 98%   Weight: 81.8 kg (180 lb 6.4 oz)   Height: 182.9 cm (72\")   PainSc: 0-No pain     Body mass index is 24.47 kg/m².  BMI is within normal parameters. No other follow-up for BMI required.     Advance Care Planning         Current Outpatient Medications:     rosuvastatin (CRESTOR) 20 " MG tablet, Take 1 tablet by mouth Daily., Disp: 90 tablet, Rfl: 3    vitamin D (ERGOCALCIFEROL) 1.25 MG (78678 UT) capsule capsule, Take 1 capsule by mouth 1 (One) Time Per Week., Disp: 5 capsule, Rfl: 0         Results Review:    Results  Labs   - Vitamin D level: 25   - Cholesterol: 209   - HDL: Low   - LDL: 164   - TSH: Normal   - Blood sugar: Normal   - Kidney function: Normal   - Liver function: Normal   - Body salts: Normal   - CBC: No anemia, eosinophils 7.6%    Imaging   - MR scan of the lumbar spine: L4-5 disk and some facet hypertrophy or arthritic changes     Procedures labs of May 2 discussed    Medication Review: Medications reviewed and noted    Patient wellness counseling  Exercise: Encouraged stretching exercises and activity of daily living no lifting  Diet: Healthy low-carb low cholesterol diet  Screening: Lab May 2 discussed  Social History     Socioeconomic History    Marital status:    Tobacco Use    Smoking status: Never    Smokeless tobacco: Never   Vaping Use    Vaping status: Some Days   Substance and Sexual Activity    Alcohol use: Never    Drug use: Never    Sexual activity: Yes     Partners: Female     Birth control/protection: Condom        Assessment/Plan:  Assessment & Plan  1. Lipidemia.  - LDL cholesterol levels are elevated at 164 mg/dL, and HDL cholesterol is low.  - Previous cholesterol level was 129 mg/dL in 10/2024, indicating a increase.  - Prescription for Crestor 90 tablets with three refills has been provided.  - Patient advised to take medication regularly to manage cholesterol levels.    2. Inflammatory arthritis.  - Reports intermittent back pain, worsened by weight gain and certain activities.  - MRI shows L5 disc and arthritic changes at L4-L5.  - Physical examination reveals no pain during leg raises or spinal movements.  - Advised to avoid heavy lifting and continue morning exercises and stretches as recommended by physiotherapist.    3. Vitamin D  deficiency.  - Vitamin D levels are below normal at 25 ng/mL.  - Patient currently taking 2000 IU daily.  - Advised to take vitamin D 50,000 IU once a week for five weeks, then switch to 5000 IU daily.    4. Health maintenance.  - Blood pressure is well-regulated at 120/78 mmHg.  - Weight remains stable at 180 pounds with a BMI of 24.5.  - Blood glucose, kidney function, liver function, and body salts are normal.  - TSH levels are within normal range.  - Eosinophil count is elevated at 7.6%, but no allergy symptoms reported.  - Overall good health status with no immediate concerns.    Follow-up  The patient will follow up in 6 months.     Problem List Items Addressed This Visit          Cardiac and Vasculature    Mixed hyperlipidemia    Overview   History of mixed hyperlipidemia on Crestor 20 mg daily denies myalgia arthralgia         Current Assessment & Plan    Mixed hyperlipidemia on Crestor 20 mg daily denies myalgia or arthralgia, recent lab of May 2, 2025 shows cholesterol slight elevation of 209 LDL of 164 with normal liver function encouraged improved low-cholesterol diet but no change in medication         Relevant Medications    rosuvastatin (CRESTOR) 20 MG tablet       Endocrine and Metabolic    Vitamin D deficiency    Overview   Vitamin D deficiency taking 2000 units daily with current vitamin D level of 25.4 on May 2, 2025  We will change the patient's vitamin D to 50,000 units once weekly for 5 weeks and then change to 5000 units daily         Current Assessment & Plan   Vitamin D level is 25.4, on 2000 units daily will increase to 5000 units daily after taking 5 weeks of 50,000 weekly            Health Encounters    Preventative health care - Primary    Overview   44-year-old male presents for annual preventative visit and physical examination on May 5, 2025            Musculoskeletal and Injuries    Inflammatory arthritis    Overview   History of inflammatory arthritis elevated by Dr. Glass had  [facility] currently complaining of metatarsal head discomfort left foot middle toe right foot second toe Celebrex 100 twice daily has been effective he currently is not on that medicine he is followed by rheumatology.         Current Assessment & Plan   Patient's inflammatory arthritis is improved he is no longer taking NSAID therapy  Generally complaining of low back pain with known L4-5 discomfort ,MR scanning of 5/23/2024 the patient is encouraged to continue regular activity with no exercise of lifting or carrying heavy object         Low back strain    Overview   Patient states he was reaching down to obtain an object on floor and felt his back with severe pain and discomfort on Friday, 12 February the pain was similar to a low back injury approximately 3 years ago the pain is primarily mid back with some left-sided weakness and radiation when he is sitting he feels its acute strain, he called his office and was given Flexeril twice daily and Advil twice daily on February 12 the patient has been receiving physical therapy at [facility] physical therapy on February 22 in February 19 with follow-up in 2 days he states his back is slightly improved on the Flexeril and Advil but occasionally has exacerbations of discomfort and pain.  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 the patient states that maintaining posture and weight and not doing any yard work or heavy lifting is improved and he takes Tylenol and/or ibuprofen as needed and is careful about stretching exercises prior to any outdoor work             Patient Instructions   Lab of May 2, 2025 noted with slight decrease vitamin D 25.4 and increase  of  the patient has been taking his medications erratically prescriptions are renewed  Crestor 20 mg daily  Vitamin D 50,000 units every week for 5 weeks then, 5000 units daily thereafter  Continue active ADL  Encouraged no lifting of significant weight for low back discomfort  Suggest stretching exercise  Continue healthy cardiac diet  Return visit in 6 months or as needed     CMP:  Lab Results   Component Value Date    Glucose 89 05/02/2025    Glucose, UA Negative 10/17/2023    BUN 10 05/02/2025    BUN/Creatinine Ratio 10.4 05/02/2025    Creatinine 0.96 05/02/2025    Creatinine, Urine 267.0 10/17/2023    Ketones, UA Negative 10/17/2023    CO2 25.1 05/02/2025    Calcium 9.8 05/02/2025    Albumin 4.4 05/02/2025    AST (SGOT) 19 05/02/2025    ALT (SGPT) 22 05/02/2025     HbA1c:  Lab Results   Component Value Date    HGBA1C 5.60 10/17/2023     Microalbumin:  Lab Results   Component Value Date    MICROALBUR 2.1 10/17/2023     Lipid Panel  Lab Results   Component Value Date    CHOL 209 (H) 05/02/2025    TRIG 98 05/02/2025    HDL 27 (L) 05/02/2025     (H) 05/02/2025    AST 19 05/02/2025    ALT 22 05/02/2025        Counseling was given to patient for the following topics: disease prevention.    Plan of care reviewed with patient at the conclusion of today's visit. Education was provided regarding diagnosis, management, and any prescribed or recommended OTC medications.Patient verbalizes understanding of and agreement with management plan.       05/05/25   12:41 EDT    Patient or patient representative verbalized consent for the use of Ambient Listening during the visit with  Max Tadeo MD for chart documentation. 5/5/2025  16:52 EDT

## 2025-05-05 NOTE — ASSESSMENT & PLAN NOTE
Vitamin D level is 25.4, on 2000 units daily will increase to 5000 units daily after taking 5 weeks of 50,000 weekly

## 2025-05-05 NOTE — ASSESSMENT & PLAN NOTE
Patient's inflammatory arthritis is improved he is no longer taking NSAID therapy  Generally complaining of low back pain with known L4-5 discomfort ,MR scanning of 5/23/2024 the patient is encouraged to continue regular activity with no exercise of lifting or carrying heavy object

## 2025-05-05 NOTE — PATIENT INSTRUCTIONS
Lab of May 2, 2025 noted with slight decrease vitamin D 25.4 and increase of  the patient has been taking his medications erratically prescriptions are renewed  Crestor 20 mg daily  Vitamin D 50,000 units every week for 5 weeks then, 5000 units daily thereafter  Continue active ADL  Encouraged no lifting of significant weight for low back discomfort  Suggest stretching exercise  Continue healthy cardiac diet  Return visit in 6 months or as needed

## 2025-05-05 NOTE — ASSESSMENT & PLAN NOTE
Mixed hyperlipidemia on Crestor 20 mg daily denies myalgia or arthralgia, recent lab of May 2, 2025 shows cholesterol slight elevation of 209 LDL of 164 with normal liver function encouraged improved low-cholesterol diet but no change in medication

## 2025-06-17 RX ORDER — ERGOCALCIFEROL 1.25 MG/1
50000 CAPSULE, LIQUID FILLED ORAL WEEKLY
Qty: 5 CAPSULE | Refills: 0 | Status: SHIPPED | OUTPATIENT
Start: 2025-06-17